# Patient Record
Sex: MALE | Race: WHITE | NOT HISPANIC OR LATINO | Employment: FULL TIME | URBAN - NONMETROPOLITAN AREA
[De-identification: names, ages, dates, MRNs, and addresses within clinical notes are randomized per-mention and may not be internally consistent; named-entity substitution may affect disease eponyms.]

---

## 2017-03-16 RX ORDER — BUSPIRONE HYDROCHLORIDE 10 MG/1
TABLET ORAL
Qty: 60 TABLET | Refills: 1 | Status: SHIPPED | OUTPATIENT
Start: 2017-03-16 | End: 2017-10-06 | Stop reason: SDUPTHER

## 2017-08-07 RX ORDER — LEVETIRACETAM 750 MG/1
TABLET ORAL
Qty: 60 TABLET | Refills: 0 | Status: SHIPPED | OUTPATIENT
Start: 2017-08-07 | End: 2017-09-10 | Stop reason: SDUPTHER

## 2017-09-11 RX ORDER — LEVETIRACETAM 750 MG/1
TABLET ORAL
Qty: 60 TABLET | Refills: 1 | Status: SHIPPED | OUTPATIENT
Start: 2017-09-11 | End: 2017-12-22 | Stop reason: SDUPTHER

## 2017-10-06 RX ORDER — BUSPIRONE HYDROCHLORIDE 10 MG/1
TABLET ORAL
Qty: 60 TABLET | Refills: 0 | Status: SHIPPED | OUTPATIENT
Start: 2017-10-06 | End: 2017-12-08 | Stop reason: SINTOL

## 2017-10-10 RX ORDER — LAMOTRIGINE 200 MG/1
TABLET ORAL
Qty: 60 TABLET | Refills: 0 | Status: SHIPPED | OUTPATIENT
Start: 2017-10-10 | End: 2017-12-20 | Stop reason: SDUPTHER

## 2017-12-08 ENCOUNTER — OFFICE VISIT (OUTPATIENT)
Dept: FAMILY MEDICINE CLINIC | Facility: CLINIC | Age: 32
End: 2017-12-08

## 2017-12-08 VITALS
HEIGHT: 69 IN | DIASTOLIC BLOOD PRESSURE: 72 MMHG | WEIGHT: 177 LBS | BODY MASS INDEX: 26.22 KG/M2 | SYSTOLIC BLOOD PRESSURE: 120 MMHG

## 2017-12-08 DIAGNOSIS — R41.840 CONCENTRATION DEFICIT: Primary | ICD-10-CM

## 2017-12-08 PROCEDURE — 99213 OFFICE O/P EST LOW 20 MIN: CPT | Performed by: NURSE PRACTITIONER

## 2017-12-08 RX ORDER — ATOMOXETINE 100 MG/1
100 CAPSULE ORAL DAILY
Qty: 30 CAPSULE | Refills: 11 | Status: SHIPPED | OUTPATIENT
Start: 2017-12-08 | End: 2018-05-11 | Stop reason: RX

## 2017-12-08 NOTE — PROGRESS NOTES
Chief Complaint   Patient presents with   • Referral for ADHD     testing      Subjective   Hammad Carpenter is a 32 y.o. male.     HPI Comments: Presents with concern with concentration issues getting worse-started the eval for add 2 years ago but never did finish the procress-symptoms are worsening     Fatigue   This is a recurrent problem. The current episode started 1 to 4 weeks ago. The problem occurs intermittently. The problem has been rapidly worsening. Associated symptoms include fatigue. Pertinent negatives include no abdominal pain, anorexia, arthralgias, change in bowel habit, chest pain, chills, congestion, coughing, diaphoresis, fever, headaches, joint swelling, myalgias, nausea, neck pain, numbness, rash, sore throat, swollen glands, urinary symptoms, vertigo, visual change, vomiting or weakness. Associated symptoms comments: Decrease labido associated -had labs in July . Nothing aggravates the symptoms. Treatments tried: working out  The treatment provided no relief.        The following portions of the patient's history were reviewed and updated as appropriate: allergies, current medications, past social history and problem list.    Review of Systems   Constitutional: Positive for fatigue. Negative for activity change, appetite change, chills, diaphoresis and fever.        Chronic anemia noted for past 5 years-will obtain records from another source to compare    HENT: Negative.  Negative for congestion and sore throat.    Eyes: Negative.  Negative for photophobia, pain, discharge, redness, itching and visual disturbance.   Respiratory: Negative.  Negative for apnea, cough, choking and chest tightness.    Cardiovascular: Negative.  Negative for chest pain, palpitations and leg swelling.   Gastrointestinal: Negative.  Negative for abdominal pain, anorexia, change in bowel habit, nausea and vomiting.   Endocrine: Negative.    Genitourinary: Negative.  Negative for difficulty urinating and  "dysuria.        Fatigue and decreased labido    Musculoskeletal: Negative.  Negative for arthralgias, back pain, gait problem, joint swelling, myalgias and neck pain.   Skin: Negative.  Negative for rash.   Allergic/Immunologic: Negative.    Neurological: Negative.  Negative for vertigo, tremors, weakness, numbness and headaches.   Hematological: Negative.  Negative for adenopathy. Does not bruise/bleed easily.   Psychiatric/Behavioral: Positive for decreased concentration and sleep disturbance. Negative for agitation, behavioral problems, confusion, dysphoric mood, hallucinations, self-injury and suicidal ideas. The patient is nervous/anxious. The patient is not hyperactive.        Objective   /72  Ht 175.3 cm (69\")  Wt 80.3 kg (177 lb)  BMI 26.14 kg/m2  Physical Exam   Constitutional: He is oriented to person, place, and time. He appears well-developed and well-nourished. No distress.   HENT:   Head: Normocephalic and atraumatic.   Mouth/Throat: No oropharyngeal exudate.   Eyes: EOM are normal. Pupils are equal, round, and reactive to light. Right eye exhibits no discharge. Left eye exhibits no discharge. No scleral icterus.   Neck: Normal range of motion. Neck supple. No JVD present. No tracheal deviation present. No thyromegaly present.   Cardiovascular: Normal rate.  Exam reveals no gallop and no friction rub.    No murmur heard.  Pulmonary/Chest: Effort normal and breath sounds normal. No stridor. No respiratory distress. He has no wheezes. He has no rales. He exhibits no tenderness.   Abdominal: Soft. Bowel sounds are normal. He exhibits no distension and no mass. There is no tenderness. There is no rebound and no guarding. No hernia.   Musculoskeletal: Normal range of motion. He exhibits no edema, tenderness or deformity.   Lymphadenopathy:     He has no cervical adenopathy.   Neurological: He is alert and oriented to person, place, and time. He has normal reflexes. He displays normal reflexes. No " cranial nerve deficit. He exhibits normal muscle tone. Coordination normal.   Skin: Skin is warm and dry. No rash noted. He is not diaphoretic. No erythema. No pallor.   Nursing note and vitals reviewed.      Assessment/Plan   Problem List Items Addressed This Visit        Other    Concentration deficit - Primary           New Medications Ordered This Visit   Medications   • atomoxetine (STRATTERA) 100 MG capsule     Sig: Take 1 capsule by mouth Daily.     Dispense:  30 capsule     Refill:  11     Plan -try non stimulant first and if worsen will do the testing for possible stimulant  -patient agrees recheck in 5 weeks

## 2017-12-20 RX ORDER — LAMOTRIGINE 200 MG/1
TABLET ORAL
Qty: 60 TABLET | Refills: 5 | Status: SHIPPED | OUTPATIENT
Start: 2017-12-20 | End: 2018-10-25 | Stop reason: SDUPTHER

## 2017-12-20 RX ORDER — BUSPIRONE HYDROCHLORIDE 10 MG/1
TABLET ORAL
Qty: 60 TABLET | Refills: 5 | Status: SHIPPED | OUTPATIENT
Start: 2017-12-20 | End: 2018-05-11

## 2017-12-22 RX ORDER — LEVETIRACETAM 750 MG/1
TABLET ORAL
Qty: 60 TABLET | Refills: 1 | Status: SHIPPED | OUTPATIENT
Start: 2017-12-22 | End: 2018-05-10 | Stop reason: SDUPTHER

## 2018-01-31 ENCOUNTER — TELEPHONE (OUTPATIENT)
Dept: FAMILY MEDICINE CLINIC | Facility: CLINIC | Age: 33
End: 2018-01-31

## 2018-02-02 DIAGNOSIS — F90.9 HYPERACTIVE ADULT SYNDROME: ICD-10-CM

## 2018-02-02 DIAGNOSIS — R88.8: Primary | ICD-10-CM

## 2018-02-02 RX ORDER — GUANFACINE 1 MG/1
1 TABLET, EXTENDED RELEASE ORAL DAILY
Qty: 30 TABLET | Refills: 5 | Status: SHIPPED | OUTPATIENT
Start: 2018-02-02 | End: 2018-05-11

## 2018-04-18 ENCOUNTER — OFFICE VISIT (OUTPATIENT)
Dept: BEHAVIORAL HEALTH | Facility: CLINIC | Age: 33
End: 2018-04-18

## 2018-04-18 DIAGNOSIS — F90.0 ADHD, PREDOMINANTLY INATTENTIVE TYPE: Primary | ICD-10-CM

## 2018-04-18 PROCEDURE — 90791 PSYCH DIAGNOSTIC EVALUATION: CPT | Performed by: PSYCHOLOGIST

## 2018-04-18 NOTE — PROGRESS NOTES
4/18/2018    Hammad Carpenter, a 32 y.o. male, was seen today for initial appointment lasting 45 minutes.  Patient is referred by ESTEBAN Parker .     SUBJECTIVE:  Patient requested an evaluation of attention deficit hyperactivity disorder.  He's had problems with impulsivity and focus all of his life.  He had difficulty in school but his mother did not agree to evaluation.  Currently he is forgetful, he has trouble scheduling things, he fails to follow through on tasks.  His caused him problems at work he forgets stuff, and makes mistakes that affect his job performance.  He started an ADHD evaluation with a psychologist in 2013 but failed to complete the evaluation.    FAMILY HISTORY:  Positive for ADHD.  This man is been  15 years he has 3 children ages 14, 10, and 8.  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    MENTAL STATUS:  Zaida presents as a young man who looks his stated age.  He is oriented ×3, thoughts are goal-directed and logical, he answers questions fully and completely.  There is no evidence of substance abuse disorder or a personality disorder.  He has problems at work because it takes him too long to do task and he loses focus.  This patient reports that he is scatterbrained, starts projects without finishing them, his mind tends to wander when he is trying to focus.  He also reported that people complain that he doesn't pay attention to them, blurts out what's on his mind, interrupts people and conversations, is forgetful, and a procrastinator.  His sleep varies and he tends to be tired during the day.  He denies depression, has some mild anxiety, mild uncomfortableness in public situations.  He denies panic attacks.  He is not bonds or irritable.  He seems to handle change and transitions without difficulty but he really likes her routine.  Routines help keep him focused.    DIAGNOSIS:    ICD-10-CM ICD-9-CM   1. ADHD, predominantly inattentive type F90.0 314.00        ASSESSMENT PLAN:  Plan is to evaluate for ADHD and mood disorder.  He was given [facility] adult rating scale for he and his wife to complete, I'll testing with the [place] computerized continuous performance test          This document has been electronically signed by Stephon Kraft EdD on April 18, 2018 10:04 AM

## 2018-05-04 ENCOUNTER — OFFICE VISIT (OUTPATIENT)
Dept: BEHAVIORAL HEALTH | Facility: CLINIC | Age: 33
End: 2018-05-04

## 2018-05-04 DIAGNOSIS — F90.0 ADHD, PREDOMINANTLY INATTENTIVE TYPE: Primary | ICD-10-CM

## 2018-05-04 PROCEDURE — 90834 PSYTX W PT 45 MINUTES: CPT | Performed by: PSYCHOLOGIST

## 2018-05-04 NOTE — PROGRESS NOTES
5/4/2018    Hammad Carpenter, a 32 y.o. male, was seen today for a psychological evaluation lasting 45 minutes.  Patient is referred by ESTEBAN Parker .     SUBJECTIVE:  Patient requested an evaluation of attention deficit hyperactivity disorder.  He's had problems with impulsivity and focus all of his life.  He had difficulty in school but his mother did not agree to evaluation.  Currently he is forgetful, he has trouble scheduling things, he fails to follow through on tasks.  His caused him problems at work he forgets stuff, and makes mistakes that affect his job performance.  He started an ADHD evaluation with a psychologist in 2013 but failed to complete the evaluation.    FAMILY HISTORY:  Positive for ADHD.  This man is been  15 years he has 3 children ages 14, 10, and 8.  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    MENTAL STATUS:  Zaida presents as a young man who looks his stated age.  He is oriented ×3, thoughts are goal-directed and logical, he answers questions fully and completely.  There is no evidence of substance abuse disorder or a personality disorder.  He has problems at work because it takes him too long to do task and he loses focus.  This patient reports that he is scatterbrained, starts projects without finishing them, his mind tends to wander when he is trying to focus.  He also reported that people complain that he doesn't pay attention to them, blurts out what's on his mind, interrupts people and conversations, is forgetful, and a procrastinator.  His sleep varies and he tends to be tired during the day.  He denies depression, has some mild anxiety, mild uncomfortableness in public situations.  He denies panic attacks.  He is not bonds or irritable.  He seems to handle change and transitions without difficulty but he really likes her routine.  Routines help keep him focused.    Evaluation consisted of psychological testing with the [place] computerized continuous performance  test, and the [facility] adult Gen. symptoms checklist completed by the patient.  The [place] requires the patient to click a mouse button for certain visual and auditory targets displayed on the computer.  In addition, the patient has to refrain from clicking on the mouse button for visual and auditory distractor non-targets.  Scores on the [place] have a mean of 100 and a standard deviation of 15 points, any score of 80 or lower identifies a significant problem area.  The patient's scores were impulsivity 70, attention 77, hyperactivity 108.  The test results show that the patient made errors by not responding when a response was called for, indicating inattention.  The patient made other errors by responding when a response was not called for, indicating impulsivity.  The patient lost focus several times when his/her attention tended to wander.  There was a lot of inconsistency in the patient's response times, also showing fluctuating attention.      On the adult checklist this patient reported: Trouble sustaining attention, difficulty completing tasks, feeling overwhelmed, trouble maintaining and organized work.,  Inconsistent work performance, impulsive decision-making, difficulty delaying what he wanted, impatience and easy frustration.        DIAGNOSIS:    ICD-10-CM ICD-9-CM   1. ADHD, predominantly inattentive type F90.0 314.00       ASSESSMENT PLAN:  Recommendation is for him to see his provider for consideration of treatment with appropriate stimulant medication          This document has been electronically signed by Stephon Kraft EdD on May 4, 2018 9:54 AM

## 2018-05-10 RX ORDER — LEVETIRACETAM 750 MG/1
TABLET ORAL
Qty: 60 TABLET | Refills: 0 | Status: SHIPPED | OUTPATIENT
Start: 2018-05-10 | End: 2018-07-18 | Stop reason: SDUPTHER

## 2018-05-11 ENCOUNTER — OFFICE VISIT (OUTPATIENT)
Dept: FAMILY MEDICINE CLINIC | Facility: CLINIC | Age: 33
End: 2018-05-11

## 2018-05-11 ENCOUNTER — TELEPHONE (OUTPATIENT)
Dept: FAMILY MEDICINE CLINIC | Facility: CLINIC | Age: 33
End: 2018-05-11

## 2018-05-11 VITALS
HEART RATE: 56 BPM | WEIGHT: 167 LBS | OXYGEN SATURATION: 100 % | DIASTOLIC BLOOD PRESSURE: 78 MMHG | HEIGHT: 69 IN | BODY MASS INDEX: 24.73 KG/M2 | SYSTOLIC BLOOD PRESSURE: 118 MMHG

## 2018-05-11 VITALS
SYSTOLIC BLOOD PRESSURE: 112 MMHG | WEIGHT: 167 LBS | BODY MASS INDEX: 24.73 KG/M2 | DIASTOLIC BLOOD PRESSURE: 80 MMHG | HEIGHT: 69 IN

## 2018-05-11 DIAGNOSIS — G40.909 SEIZURE DISORDER (HCC): ICD-10-CM

## 2018-05-11 DIAGNOSIS — Z79.899 HIGH RISK MEDICATION USE: Primary | ICD-10-CM

## 2018-05-11 DIAGNOSIS — F90.0 ADHD, PREDOMINANTLY INATTENTIVE TYPE: ICD-10-CM

## 2018-05-11 DIAGNOSIS — R41.840 CONCENTRATION DEFICIT: Primary | ICD-10-CM

## 2018-05-11 PROCEDURE — 99213 OFFICE O/P EST LOW 20 MIN: CPT | Performed by: FAMILY MEDICINE

## 2018-05-11 PROCEDURE — 93005 ELECTROCARDIOGRAM TRACING: CPT | Performed by: FAMILY MEDICINE

## 2018-05-11 PROCEDURE — 93010 ELECTROCARDIOGRAM REPORT: CPT | Performed by: INTERNAL MEDICINE

## 2018-05-11 NOTE — PROGRESS NOTES
"Allison Carpenter is a 32 y.o. male.     History of Present Illness   compliance with medication regimen  often fidgets with hands or feet or squirms in seat - Yes , is often \"on the go\" or often acts as if \"driven by a motor\" - Yes  and often talks excessively - Yes     often has difficulty awaiting turn - Yes  and often interrupts or intrudes on others - Yes           The following portions of the patient's history were reviewed and updated as appropriate: allergies, current medications, past family history, past medical history, past social history, past surgical history and problem list.    Review of Systems   Cardiovascular: Negative for palpitations.   Psychiatric/Behavioral: Positive for sleep disturbance.       Objective   Physical Exam   Constitutional: He is oriented to person, place, and time. He appears well-developed and well-nourished. No distress.   HENT:   Head: Normocephalic and atraumatic.   Cardiovascular: Normal rate, regular rhythm and normal heart sounds.    Pulmonary/Chest: Effort normal and breath sounds normal.   Neurological: He is alert and oriented to person, place, and time. No cranial nerve deficit or sensory deficit.   Reflex Scores:       Patellar reflexes are 2+ on the right side and 2+ on the left side.  Skin: Skin is warm and dry. He is not diaphoretic.   Psychiatric: He has a normal mood and affect. His behavior is normal. Judgment and thought content normal.   Nursing note and vitals reviewed.      Assessment/Plan   Problems Addressed this Visit        Other    ADHD, predominantly inattentive type      Other Visit Diagnoses     High risk medication use    -  Primary    Relevant Orders    ECG 12 Lead (Completed)    Seizure disorder                Will request 2nd opinion with neurology before starting medication due to potential drug-drug interaction and risk of inducing Seizures. Will arrange once he checks with his insurance company. Last seizure 2006.     "

## 2018-05-11 NOTE — PROGRESS NOTES
Chief Complaint   Patient presents with   • Follow-up     after seeing Dr Swapnil Cueva Emmanuel Carpenter is a 32 y.o. male.     Presents with concern with concentration issues getting worse-started the eval for add 2 years ago but never did finish the procress-symptoms are worsening -recent Diagnosis with ADD with Stephon Kraft       Fatigue   This is a recurrent problem. The current episode started 1 to 4 weeks ago. The problem occurs intermittently. The problem has been rapidly worsening. Associated symptoms include fatigue. Pertinent negatives include no abdominal pain, arthralgias, chest pain, chills, congestion, coughing, diaphoresis, fever, joint swelling, myalgias, nausea, neck pain or vomiting. Associated symptoms comments: Decrease labido associated -had labs in July . Nothing aggravates the symptoms. Treatments tried: working out -staying stress  The treatment provided no relief.        The following portions of the patient's history were reviewed and updated as appropriate: allergies, current medications, past social history and problem list.    Review of Systems   Constitutional: Positive for fatigue. Negative for activity change, appetite change, chills, diaphoresis, fever and unexpected weight change.   HENT: Negative.  Negative for congestion, dental problem, drooling, ear discharge and ear pain.    Eyes: Negative.  Negative for photophobia, pain, discharge, redness, itching and visual disturbance.   Respiratory: Negative.  Negative for apnea, cough, choking, chest tightness, shortness of breath, wheezing and stridor.    Cardiovascular: Negative.  Negative for chest pain, palpitations and leg swelling.   Gastrointestinal: Negative.  Negative for abdominal distention, abdominal pain, anal bleeding, blood in stool, constipation, diarrhea, nausea, rectal pain and vomiting.   Endocrine: Negative.  Negative for cold intolerance, heat intolerance and polydipsia.   Genitourinary: Negative.   "  Musculoskeletal: Negative.  Negative for arthralgias, back pain, gait problem, joint swelling, myalgias, neck pain and neck stiffness.   Skin: Negative.    Allergic/Immunologic: Negative.    Neurological: Negative.    Hematological: Negative.    Psychiatric/Behavioral: Positive for decreased concentration.        Concentration issues-see Stephon Cortes note for further information    All other systems reviewed and are negative.      Objective   /80   Ht 175.3 cm (69\")   Wt 75.8 kg (167 lb)   BMI 24.66 kg/m²   Physical Exam   Constitutional: He is oriented to person, place, and time. He appears well-developed and well-nourished. No distress.   HENT:   Head: Normocephalic and atraumatic.   Right Ear: External ear normal.   Left Ear: External ear normal.   Mouth/Throat: No oropharyngeal exudate.   Eyes: EOM are normal. Pupils are equal, round, and reactive to light. Right eye exhibits no discharge. Left eye exhibits no discharge. No scleral icterus.   Neck: Normal range of motion. Neck supple. No JVD present. No tracheal deviation present. No thyromegaly present.   Cardiovascular: Normal rate and regular rhythm.  Exam reveals no gallop and no friction rub.    No murmur heard.  Pulmonary/Chest: Effort normal and breath sounds normal. No stridor. No respiratory distress. He has no wheezes. He has no rales. He exhibits no tenderness.   Abdominal: Soft. Bowel sounds are normal. He exhibits no distension and no mass. There is no tenderness. There is no rebound and no guarding. No hernia.   Musculoskeletal: Normal range of motion. He exhibits no edema, tenderness or deformity.   Lymphadenopathy:     He has no cervical adenopathy.   Neurological: He is alert and oriented to person, place, and time. He has normal reflexes. He displays normal reflexes. No cranial nerve deficit. He exhibits normal muscle tone. Coordination normal.   Skin: Skin is warm and dry. No rash noted. He is not diaphoretic. No erythema. No " pallor.   Nursing note and vitals reviewed.      Assessment/Plan   Problem List Items Addressed This Visit        Other    Concentration deficit - Primary      Other Visit Diagnoses    None.        No orders of the defined types were placed in this encounter.     will be following with Dr Pizarro for ADD treatment today after my visit

## 2018-05-21 DIAGNOSIS — G40.909 SEIZURE DISORDER (HCC): Primary | ICD-10-CM

## 2018-06-29 ENCOUNTER — OFFICE VISIT (OUTPATIENT)
Dept: FAMILY MEDICINE CLINIC | Facility: CLINIC | Age: 33
End: 2018-06-29

## 2018-06-29 ENCOUNTER — TELEPHONE (OUTPATIENT)
Dept: FAMILY MEDICINE CLINIC | Facility: CLINIC | Age: 33
End: 2018-06-29

## 2018-06-29 VITALS
DIASTOLIC BLOOD PRESSURE: 74 MMHG | HEART RATE: 68 BPM | OXYGEN SATURATION: 99 % | SYSTOLIC BLOOD PRESSURE: 120 MMHG | BODY MASS INDEX: 25.56 KG/M2 | HEIGHT: 69 IN | WEIGHT: 172.6 LBS

## 2018-06-29 DIAGNOSIS — F90.0 ADHD, PREDOMINANTLY INATTENTIVE TYPE: Primary | ICD-10-CM

## 2018-06-29 PROCEDURE — 99213 OFFICE O/P EST LOW 20 MIN: CPT | Performed by: FAMILY MEDICINE

## 2018-06-29 RX ORDER — DEXTROAMPHETAMINE SACCHARATE, AMPHETAMINE ASPARTATE MONOHYDRATE, DEXTROAMPHETAMINE SULFATE AND AMPHETAMINE SULFATE 5; 5; 5; 5 MG/1; MG/1; MG/1; MG/1
20 CAPSULE, EXTENDED RELEASE ORAL EVERY MORNING
Qty: 30 CAPSULE | Refills: 0 | Status: SHIPPED | OUTPATIENT
Start: 2018-07-30 | End: 2018-07-02

## 2018-06-29 RX ORDER — DEXTROAMPHETAMINE SACCHARATE, AMPHETAMINE ASPARTATE MONOHYDRATE, DEXTROAMPHETAMINE SULFATE AND AMPHETAMINE SULFATE 5; 5; 5; 5 MG/1; MG/1; MG/1; MG/1
20 CAPSULE, EXTENDED RELEASE ORAL EVERY MORNING
Qty: 30 CAPSULE | Refills: 0 | Status: SHIPPED | OUTPATIENT
Start: 2018-06-29 | End: 2018-07-02

## 2018-06-29 RX ORDER — DEXTROAMPHETAMINE SACCHARATE, AMPHETAMINE ASPARTATE MONOHYDRATE, DEXTROAMPHETAMINE SULFATE AND AMPHETAMINE SULFATE 5; 5; 5; 5 MG/1; MG/1; MG/1; MG/1
20 CAPSULE, EXTENDED RELEASE ORAL EVERY MORNING
Qty: 30 CAPSULE | Refills: 0 | Status: SHIPPED | OUTPATIENT
Start: 2018-08-29 | End: 2018-07-02

## 2018-06-29 NOTE — PROGRESS NOTES
"Allison Carpenter is a 32 y.o. male.     History of Present Illness     compliance with medication regimen  often fidgets with hands or feet or squirms in seat - Yes , is often \"on the go\" or often acts as if \"driven by a motor\" - Yes  and often talks excessively - Yes     often has difficulty awaiting turn - Yes  and often interrupts or intrudes on others - Yes           The following portions of the patient's history were reviewed and updated as appropriate: allergies, current medications, past family history, past medical history, past social history, past surgical history and problem list.    Review of Systems   Constitutional: Positive for fatigue.   Cardiovascular: Negative for palpitations.   Psychiatric/Behavioral: Positive for sleep disturbance.       Objective   Physical Exam   Constitutional: He is oriented to person, place, and time. He appears well-developed and well-nourished. No distress.   HENT:   Head: Normocephalic and atraumatic.   Cardiovascular: Normal rate, regular rhythm and normal heart sounds.    Pulmonary/Chest: Effort normal and breath sounds normal.   Neurological: He is alert and oriented to person, place, and time. No cranial nerve deficit or sensory deficit.   Reflex Scores:       Patellar reflexes are 2+ on the right side and 2+ on the left side.  Skin: Skin is warm and dry. He is not diaphoretic.   Psychiatric: He has a normal mood and affect. His behavior is normal. Judgment and thought content normal.   Nursing note and vitals reviewed.      Assessment/Plan   Problems Addressed this Visit        Other    ADHD, predominantly inattentive type - Primary    Relevant Medications    amphetamine-dextroamphetamine XR (ADDERALL XR) 20 MG 24 hr capsule    amphetamine-dextroamphetamine XR (ADDERALL XR) 20 MG 24 hr capsule (Start on 7/30/2018)    amphetamine-dextroamphetamine XR (ADDERALL XR) 20 MG 24 hr capsule (Start on 8/29/2018)            Neurologist note reviewed and " risk of seizure on medication discusseed.

## 2018-07-02 ENCOUNTER — TELEPHONE (OUTPATIENT)
Dept: FAMILY MEDICINE CLINIC | Facility: CLINIC | Age: 33
End: 2018-07-02

## 2018-07-02 RX ORDER — DEXTROAMPHETAMINE SACCHARATE, AMPHETAMINE ASPARTATE, DEXTROAMPHETAMINE SULFATE AND AMPHETAMINE SULFATE 2.5; 2.5; 2.5; 2.5 MG/1; MG/1; MG/1; MG/1
10 TABLET ORAL 2 TIMES DAILY
Qty: 60 TABLET | Refills: 0 | Status: SHIPPED | OUTPATIENT
Start: 2018-07-02 | End: 2018-07-02 | Stop reason: SDUPTHER

## 2018-07-02 RX ORDER — DEXTROAMPHETAMINE SACCHARATE, AMPHETAMINE ASPARTATE, DEXTROAMPHETAMINE SULFATE AND AMPHETAMINE SULFATE 2.5; 2.5; 2.5; 2.5 MG/1; MG/1; MG/1; MG/1
10 TABLET ORAL 2 TIMES DAILY
Qty: 60 TABLET | Refills: 0 | Status: SHIPPED | OUTPATIENT
Start: 2018-07-02 | End: 2018-10-01 | Stop reason: SDUPTHER

## 2018-07-18 RX ORDER — LEVETIRACETAM 750 MG/1
TABLET ORAL
Qty: 60 TABLET | Refills: 5 | Status: SHIPPED | OUTPATIENT
Start: 2018-07-18 | End: 2019-01-30 | Stop reason: SDUPTHER

## 2018-10-01 ENCOUNTER — OFFICE VISIT (OUTPATIENT)
Dept: FAMILY MEDICINE CLINIC | Facility: CLINIC | Age: 33
End: 2018-10-01

## 2018-10-01 VITALS
WEIGHT: 165.4 LBS | BODY MASS INDEX: 24.5 KG/M2 | OXYGEN SATURATION: 99 % | SYSTOLIC BLOOD PRESSURE: 118 MMHG | DIASTOLIC BLOOD PRESSURE: 78 MMHG | HEIGHT: 69 IN | HEART RATE: 71 BPM

## 2018-10-01 DIAGNOSIS — F90.0 ADHD, PREDOMINANTLY INATTENTIVE TYPE: Primary | ICD-10-CM

## 2018-10-01 PROCEDURE — 99213 OFFICE O/P EST LOW 20 MIN: CPT | Performed by: FAMILY MEDICINE

## 2018-10-01 RX ORDER — DEXTROAMPHETAMINE SACCHARATE, AMPHETAMINE ASPARTATE, DEXTROAMPHETAMINE SULFATE AND AMPHETAMINE SULFATE 2.5; 2.5; 2.5; 2.5 MG/1; MG/1; MG/1; MG/1
10 TABLET ORAL 2 TIMES DAILY
Qty: 60 TABLET | Refills: 0 | Status: SHIPPED | OUTPATIENT
Start: 2018-10-01 | End: 2018-10-01 | Stop reason: SDUPTHER

## 2018-10-01 RX ORDER — DEXTROAMPHETAMINE SACCHARATE, AMPHETAMINE ASPARTATE, DEXTROAMPHETAMINE SULFATE AND AMPHETAMINE SULFATE 2.5; 2.5; 2.5; 2.5 MG/1; MG/1; MG/1; MG/1
10 TABLET ORAL 2 TIMES DAILY
Qty: 60 TABLET | Refills: 0 | Status: SHIPPED | OUTPATIENT
Start: 2018-10-01 | End: 2018-12-28 | Stop reason: SDUPTHER

## 2018-10-01 NOTE — PROGRESS NOTES
"Allison Carpenter is a 33 y.o. male.     History of Present Illness     compliance with medication regimen  often fidgets with hands or feet or squirms in seat - Yes , is often \"on the go\" or often acts as if \"driven by a motor\" - Yes  and often talks excessively - Yes     often has difficulty awaiting turn - Yes  and often interrupts or intrudes on others - Yes           The following portions of the patient's history were reviewed and updated as appropriate: allergies, current medications, past family history, past medical history, past social history, past surgical history and problem list.    Review of Systems   Cardiovascular: Negative for palpitations.   Psychiatric/Behavioral: Negative for sleep disturbance.       Objective   Physical Exam   Constitutional: He is oriented to person, place, and time. He appears well-developed and well-nourished. No distress.   HENT:   Head: Normocephalic and atraumatic.   Cardiovascular: Normal rate, regular rhythm and normal heart sounds.    Pulmonary/Chest: Effort normal and breath sounds normal.   Neurological: He is alert and oriented to person, place, and time. No cranial nerve deficit or sensory deficit.   Reflex Scores:       Patellar reflexes are 2+ on the right side and 2+ on the left side.  Skin: Skin is warm and dry. He is not diaphoretic.   Psychiatric: He has a normal mood and affect. His behavior is normal. Judgment and thought content normal.   Nursing note and vitals reviewed.      Assessment/Plan   Problems Addressed this Visit        Other    ADHD, predominantly inattentive type - Primary    Relevant Medications    amphetamine-dextroamphetamine (ADDERALL) 10 MG tablet                 "

## 2018-10-25 RX ORDER — LAMOTRIGINE 200 MG/1
TABLET ORAL
Qty: 60 TABLET | Refills: 4 | Status: SHIPPED | OUTPATIENT
Start: 2018-10-25 | End: 2019-04-07 | Stop reason: SDUPTHER

## 2018-12-28 ENCOUNTER — APPOINTMENT (OUTPATIENT)
Dept: LAB | Facility: HOSPITAL | Age: 33
End: 2018-12-28

## 2018-12-28 ENCOUNTER — OFFICE VISIT (OUTPATIENT)
Dept: FAMILY MEDICINE CLINIC | Facility: CLINIC | Age: 33
End: 2018-12-28

## 2018-12-28 VITALS
BODY MASS INDEX: 25.87 KG/M2 | OXYGEN SATURATION: 99 % | SYSTOLIC BLOOD PRESSURE: 120 MMHG | WEIGHT: 174.7 LBS | HEART RATE: 79 BPM | HEIGHT: 69 IN | DIASTOLIC BLOOD PRESSURE: 78 MMHG

## 2018-12-28 DIAGNOSIS — F90.0 ADHD, PREDOMINANTLY INATTENTIVE TYPE: ICD-10-CM

## 2018-12-28 DIAGNOSIS — Z79.899 HIGH RISK MEDICATION USE: Primary | ICD-10-CM

## 2018-12-28 DIAGNOSIS — G40.909 SEIZURE DISORDER (HCC): ICD-10-CM

## 2018-12-28 LAB
ALBUMIN SERPL-MCNC: 4.6 G/DL (ref 3.4–4.8)
ALBUMIN/GLOB SERPL: 1.7 G/DL (ref 1.1–1.8)
ALP SERPL-CCNC: 109 U/L (ref 38–126)
ALT SERPL W P-5'-P-CCNC: 23 U/L (ref 21–72)
ANION GAP SERPL CALCULATED.3IONS-SCNC: 7 MMOL/L (ref 5–15)
AST SERPL-CCNC: 34 U/L (ref 17–59)
BASOPHILS # BLD AUTO: 0.03 10*3/MM3 (ref 0–0.2)
BASOPHILS NFR BLD AUTO: 0.4 % (ref 0–2)
BILIRUB SERPL-MCNC: 0.6 MG/DL (ref 0.2–1.3)
BUN BLD-MCNC: 16 MG/DL (ref 7–21)
BUN/CREAT SERPL: 14.7 (ref 7–25)
CALCIUM SPEC-SCNC: 9.3 MG/DL (ref 8.4–10.2)
CHLORIDE SERPL-SCNC: 99 MMOL/L (ref 95–110)
CO2 SERPL-SCNC: 31 MMOL/L (ref 22–31)
CREAT BLD-MCNC: 1.09 MG/DL (ref 0.7–1.3)
DEPRECATED RDW RBC AUTO: 37 FL (ref 35.1–43.9)
EOSINOPHIL # BLD AUTO: 0.07 10*3/MM3 (ref 0–0.7)
EOSINOPHIL NFR BLD AUTO: 0.9 % (ref 0–7)
ERYTHROCYTE [DISTWIDTH] IN BLOOD BY AUTOMATED COUNT: 11.9 % (ref 11.5–14.5)
GFR SERPL CREATININE-BSD FRML MDRD: 77 ML/MIN/1.73 (ref 70–162)
GLOBULIN UR ELPH-MCNC: 2.7 GM/DL (ref 2.3–3.5)
GLUCOSE BLD-MCNC: 102 MG/DL (ref 60–100)
HCT VFR BLD AUTO: 38.5 % (ref 39–49)
HGB BLD-MCNC: 13.7 G/DL (ref 13.7–17.3)
IMM GRANULOCYTES # BLD AUTO: 0.01 10*3/MM3 (ref 0–0.02)
IMM GRANULOCYTES NFR BLD AUTO: 0.1 % (ref 0–0.5)
LYMPHOCYTES # BLD AUTO: 2.02 10*3/MM3 (ref 0.6–4.2)
LYMPHOCYTES NFR BLD AUTO: 26.9 % (ref 10–50)
MCH RBC QN AUTO: 30.5 PG (ref 26.5–34)
MCHC RBC AUTO-ENTMCNC: 35.6 G/DL (ref 31.5–36.3)
MCV RBC AUTO: 85.7 FL (ref 80–98)
MONOCYTES # BLD AUTO: 0.45 10*3/MM3 (ref 0–0.9)
MONOCYTES NFR BLD AUTO: 6 % (ref 0–12)
NEUTROPHILS # BLD AUTO: 4.93 10*3/MM3 (ref 2–8.6)
NEUTROPHILS NFR BLD AUTO: 65.7 % (ref 37–80)
PLATELET # BLD AUTO: 253 10*3/MM3 (ref 150–450)
PMV BLD AUTO: 9.2 FL (ref 8–12)
POTASSIUM BLD-SCNC: 4.1 MMOL/L (ref 3.5–5.1)
PROT SERPL-MCNC: 7.3 G/DL (ref 6.3–8.6)
RBC # BLD AUTO: 4.49 10*6/MM3 (ref 4.37–5.74)
SODIUM BLD-SCNC: 137 MMOL/L (ref 137–145)
WBC NRBC COR # BLD: 7.51 10*3/MM3 (ref 3.2–9.8)

## 2018-12-28 PROCEDURE — 99213 OFFICE O/P EST LOW 20 MIN: CPT | Performed by: FAMILY MEDICINE

## 2018-12-28 PROCEDURE — 36415 COLL VENOUS BLD VENIPUNCTURE: CPT | Performed by: FAMILY MEDICINE

## 2018-12-28 PROCEDURE — 85025 COMPLETE CBC W/AUTO DIFF WBC: CPT | Performed by: FAMILY MEDICINE

## 2018-12-28 PROCEDURE — 80053 COMPREHEN METABOLIC PANEL: CPT | Performed by: FAMILY MEDICINE

## 2018-12-28 RX ORDER — DEXTROAMPHETAMINE SACCHARATE, AMPHETAMINE ASPARTATE, DEXTROAMPHETAMINE SULFATE AND AMPHETAMINE SULFATE 2.5; 2.5; 2.5; 2.5 MG/1; MG/1; MG/1; MG/1
10 TABLET ORAL 3 TIMES DAILY
Qty: 90 TABLET | Refills: 0 | Status: SHIPPED | OUTPATIENT
Start: 2018-12-28 | End: 2018-12-28 | Stop reason: SDUPTHER

## 2018-12-28 RX ORDER — DEXTROAMPHETAMINE SACCHARATE, AMPHETAMINE ASPARTATE, DEXTROAMPHETAMINE SULFATE AND AMPHETAMINE SULFATE 2.5; 2.5; 2.5; 2.5 MG/1; MG/1; MG/1; MG/1
10 TABLET ORAL 3 TIMES DAILY
Qty: 90 TABLET | Refills: 0 | Status: SHIPPED | OUTPATIENT
Start: 2018-12-28 | End: 2019-02-26 | Stop reason: SDUPTHER

## 2018-12-28 NOTE — PROGRESS NOTES
"Allison Carpenter is a 33 y.o. male.     Seizures    This is a chronic (none rescently) problem. The problem has not changed since onset.     compliance with medication regimen  often fidgets with hands or feet or squirms in seat -no , is often \"on the go\" or often acts as if \"driven by a motor\" -no  and often talks excessively - no     often has difficulty awaiting turn - no  and often interrupts or intrudes on others - no      Focus issues at noon. Will change to tid    The following portions of the patient's history were reviewed and updated as appropriate: allergies, current medications, past family history, past medical history, past social history, past surgical history and problem list.    Review of Systems   Cardiovascular: Negative for palpitations.   Neurological: Positive for seizures.   Psychiatric/Behavioral: Negative for sleep disturbance.       Objective   Physical Exam   Constitutional: He is oriented to person, place, and time. He appears well-developed and well-nourished. No distress.   HENT:   Head: Normocephalic and atraumatic.   Cardiovascular: Normal rate, regular rhythm and normal heart sounds.   Pulmonary/Chest: Effort normal and breath sounds normal.   Neurological: He is alert and oriented to person, place, and time. No cranial nerve deficit or sensory deficit.   Reflex Scores:       Patellar reflexes are 2+ on the right side and 2+ on the left side.  Skin: Skin is warm and dry. He is not diaphoretic.   Psychiatric: He has a normal mood and affect. His behavior is normal. Judgment and thought content normal.   Nursing note and vitals reviewed.      Assessment/Plan   Problems Addressed this Visit        Other    ADHD, predominantly inattentive type    Relevant Medications    amphetamine-dextroamphetamine (ADDERALL) 10 MG tablet      Other Visit Diagnoses     High risk medication use    -  Primary    Relevant Orders    Comprehensive Metabolic Panel    CBC & Differential    " Seizure disorder (CMS/HCC)

## 2019-01-30 RX ORDER — LEVETIRACETAM 750 MG/1
TABLET ORAL
Qty: 60 TABLET | Refills: 5 | Status: SHIPPED | OUTPATIENT
Start: 2019-01-30 | End: 2019-08-22 | Stop reason: SDUPTHER

## 2019-02-26 ENCOUNTER — TELEPHONE (OUTPATIENT)
Dept: FAMILY MEDICINE CLINIC | Facility: CLINIC | Age: 34
End: 2019-02-26

## 2019-02-26 RX ORDER — DEXTROAMPHETAMINE SACCHARATE, AMPHETAMINE ASPARTATE, DEXTROAMPHETAMINE SULFATE AND AMPHETAMINE SULFATE 2.5; 2.5; 2.5; 2.5 MG/1; MG/1; MG/1; MG/1
10 TABLET ORAL 3 TIMES DAILY
Qty: 90 TABLET | Refills: 0 | Status: SHIPPED | OUTPATIENT
Start: 2019-02-26 | End: 2019-03-25 | Stop reason: SDUPTHER

## 2019-02-26 NOTE — TELEPHONE ENCOUNTER
Called patient and LM letting him know that Rx was ready and could be picked up at  at his convenience.

## 2019-02-26 NOTE — TELEPHONE ENCOUNTER
He called and said he needs to get pres for his adderall 10 mg sent to [facility] in [facility] -said he gets it for 3 months and is time to get it for March and the pharmacy said they can only do for 60 days and needs it for this month. He uses [facility] in [facility] and can be reached at [phone].

## 2019-03-25 ENCOUNTER — OFFICE VISIT (OUTPATIENT)
Dept: FAMILY MEDICINE CLINIC | Facility: CLINIC | Age: 34
End: 2019-03-25

## 2019-03-25 VITALS
HEART RATE: 76 BPM | DIASTOLIC BLOOD PRESSURE: 72 MMHG | BODY MASS INDEX: 25.18 KG/M2 | OXYGEN SATURATION: 98 % | HEIGHT: 69 IN | SYSTOLIC BLOOD PRESSURE: 120 MMHG | WEIGHT: 170 LBS

## 2019-03-25 DIAGNOSIS — G40.909 SEIZURE DISORDER (HCC): Primary | ICD-10-CM

## 2019-03-25 DIAGNOSIS — F90.0 ADHD, PREDOMINANTLY INATTENTIVE TYPE: ICD-10-CM

## 2019-03-25 PROCEDURE — 99213 OFFICE O/P EST LOW 20 MIN: CPT | Performed by: FAMILY MEDICINE

## 2019-03-25 RX ORDER — DEXTROAMPHETAMINE SACCHARATE, AMPHETAMINE ASPARTATE, DEXTROAMPHETAMINE SULFATE AND AMPHETAMINE SULFATE 2.5; 2.5; 2.5; 2.5 MG/1; MG/1; MG/1; MG/1
10 TABLET ORAL 3 TIMES DAILY
Qty: 90 TABLET | Refills: 0 | Status: SHIPPED | OUTPATIENT
Start: 2019-03-25 | End: 2019-05-28 | Stop reason: SDUPTHER

## 2019-03-25 RX ORDER — DEXTROAMPHETAMINE SACCHARATE, AMPHETAMINE ASPARTATE, DEXTROAMPHETAMINE SULFATE AND AMPHETAMINE SULFATE 2.5; 2.5; 2.5; 2.5 MG/1; MG/1; MG/1; MG/1
10 TABLET ORAL 3 TIMES DAILY
Qty: 90 TABLET | Refills: 0 | Status: SHIPPED | OUTPATIENT
Start: 2019-03-25 | End: 2019-03-25 | Stop reason: SDUPTHER

## 2019-03-25 NOTE — PROGRESS NOTES
"Allison Carpenter is a 33 y.o. male.     Seizures    This is a chronic (none rescently) problem. The problem has not changed since onset.     compliance with medication regimen  often fidgets with hands or feet or squirms in seat -no , is often \"on the go\" or often acts as if \"driven by a motor\" -no  and often talks excessively - no     often has difficulty awaiting turn - no  and often interrupts or intrudes on others - no      Focus issues at noon. Will change to tid    The following portions of the patient's history were reviewed and updated as appropriate: allergies, current medications, past family history, past medical history, past social history, past surgical history and problem list.    Review of Systems   Cardiovascular: Negative for palpitations.   Neurological: Positive for seizures.   Psychiatric/Behavioral: Negative for sleep disturbance.       Objective   Physical Exam   Constitutional: He is oriented to person, place, and time. He appears well-developed and well-nourished. No distress.   HENT:   Head: Normocephalic and atraumatic.   Cardiovascular: Normal rate, regular rhythm and normal heart sounds.   Pulmonary/Chest: Effort normal and breath sounds normal.   Neurological: He is alert and oriented to person, place, and time. No cranial nerve deficit or sensory deficit.   Reflex Scores:       Patellar reflexes are 2+ on the right side and 2+ on the left side.  Skin: Skin is warm and dry. He is not diaphoretic.   Psychiatric: He has a normal mood and affect. His behavior is normal. Judgment and thought content normal.   Nursing note and vitals reviewed.      Assessment/Plan   Problems Addressed this Visit        Other    ADHD, predominantly inattentive type    Relevant Medications    amphetamine-dextroamphetamine (ADDERALL) 10 MG tablet      Other Visit Diagnoses     Seizure disorder (CMS/[facility])    -  Primary                 "

## 2019-03-28 ENCOUNTER — TELEPHONE (OUTPATIENT)
Dept: FAMILY MEDICINE CLINIC | Facility: CLINIC | Age: 34
End: 2019-03-28

## 2019-03-28 NOTE — TELEPHONE ENCOUNTER
KAREN CROWLEY HAD GONE TO  PRESP FOR HIS ADDERALL AT [facility] AND THEY TOLD HIM DR MOYER WOULD HAVE TO CALL HIS [employer] INSURANCE AND VERIFY THE MEDICATION...PLEASE CK INTO THIS FOR THEM  CALLBACK# [phone]

## 2019-03-29 NOTE — TELEPHONE ENCOUNTER
I have not received anything from his pharmacy nor his insurance company pertaining to his Adderall.  If or when I do, I will take care of it for him.  If her calls, please let him know to check with his pharmacy and tell them to send for a PA if that is what is needed.  I can't start a PA process until I know it's been denied by his insurance through his pharmacy.

## 2019-04-08 RX ORDER — LAMOTRIGINE 200 MG/1
TABLET ORAL
Qty: 60 TABLET | Refills: 3 | Status: SHIPPED | OUTPATIENT
Start: 2019-04-08 | End: 2019-08-22 | Stop reason: SDUPTHER

## 2019-04-12 ENCOUNTER — OFFICE VISIT (OUTPATIENT)
Dept: FAMILY MEDICINE CLINIC | Facility: CLINIC | Age: 34
End: 2019-04-12

## 2019-04-12 VITALS
WEIGHT: 170 LBS | DIASTOLIC BLOOD PRESSURE: 76 MMHG | OXYGEN SATURATION: 99 % | HEART RATE: 80 BPM | HEIGHT: 69 IN | SYSTOLIC BLOOD PRESSURE: 120 MMHG | BODY MASS INDEX: 25.18 KG/M2

## 2019-04-12 DIAGNOSIS — Z13.1 DIABETES MELLITUS SCREENING: ICD-10-CM

## 2019-04-12 DIAGNOSIS — Z00.00 ANNUAL PHYSICAL EXAM: ICD-10-CM

## 2019-04-12 DIAGNOSIS — Z23 FLU VACCINE NEED: ICD-10-CM

## 2019-04-12 DIAGNOSIS — Z13.220 SCREENING FOR HYPERLIPIDEMIA: Primary | ICD-10-CM

## 2019-04-12 PROCEDURE — 99395 PREV VISIT EST AGE 18-39: CPT | Performed by: FAMILY MEDICINE

## 2019-04-12 PROCEDURE — 90674 CCIIV4 VAC NO PRSV 0.5 ML IM: CPT | Performed by: FAMILY MEDICINE

## 2019-04-12 PROCEDURE — 90471 IMMUNIZATION ADMIN: CPT | Performed by: FAMILY MEDICINE

## 2019-04-12 RX ORDER — TRIAMCINOLONE ACETONIDE 0.25 MG/G
CREAM TOPICAL 2 TIMES DAILY
Qty: 15 G | Refills: 11 | Status: SHIPPED | OUTPATIENT
Start: 2019-04-12 | End: 2019-09-20 | Stop reason: SDUPTHER

## 2019-04-12 NOTE — PROGRESS NOTES
Subjective   Hammad Carpenter is a 33 y.o. male.   Chief complaint-patient here for an annual exam  History of Present Illness     The following portions of the patient's history were reviewed and updated as appropriate: allergies, current medications, past family history, past medical history, past social history, past surgical history and problem list.    Review of Systems   Constitutional: Negative for chills, fever and unexpected weight change.   HENT: Positive for postnasal drip, rhinorrhea and sinus pain. Negative for ear pain, sinus pressure and sore throat.    Eyes: Negative for discharge and itching.   Respiratory: Negative for cough, shortness of breath and wheezing.    Cardiovascular: Negative for chest pain, palpitations and leg swelling.   Gastrointestinal: Negative for abdominal distention, abdominal pain, anal bleeding, blood in stool, constipation, diarrhea, nausea and vomiting.   Endocrine: Negative for cold intolerance, heat intolerance, polydipsia and polyuria.   Genitourinary: Negative for dysuria and hematuria.   Musculoskeletal: Negative for arthralgias, back pain, myalgias and neck pain.   Skin: Positive for rash. Negative for wound.   Allergic/Immunologic: Negative for environmental allergies and food allergies.   Neurological: Negative for seizures, syncope and headaches.   Hematological: Negative for adenopathy. Does not bruise/bleed easily.   Psychiatric/Behavioral: Negative for dysphoric mood and sleep disturbance. The patient is not nervous/anxious.        Objective   Physical Exam   Constitutional: He is oriented to person, place, and time. He appears well-developed and well-nourished. No distress.   HENT:   Head: Normocephalic and atraumatic.   Right Ear: Hearing, tympanic membrane and external ear normal.   Left Ear: Hearing, tympanic membrane and external ear normal.   Nose: Mucosal edema and rhinorrhea present.   Mouth/Throat: Oropharynx is clear and moist and mucous membranes  are normal. No oropharyngeal exudate.   Eyes: Lids are normal. Lids are everted and swept, no foreign bodies found. Right conjunctiva is not injected. Right conjunctiva has no hemorrhage. Left conjunctiva is not injected. Left conjunctiva has no hemorrhage.   Neck: No tracheal tenderness present. Carotid bruit is not present. No tracheal deviation present. No thyroid mass and no thyromegaly present.   Cardiovascular: Normal rate and regular rhythm. Exam reveals no gallop, no S4, no distant heart sounds and no friction rub.   Pulmonary/Chest: Effort normal and breath sounds normal.   Abdominal: There is no hepatosplenomegaly. There is no tenderness.     Vascular Status -  His right foot exhibits no edema. His left foot exhibits no edema.  Lymphadenopathy:        Head (right side): No submental and no submandibular adenopathy present.        Head (left side): No submental and no submandibular adenopathy present.     He has no cervical adenopathy.   Neurological: He is alert and oriented to person, place, and time.   Skin: Skin is warm and dry. Rash noted. He is not diaphoretic.        Psychiatric: He has a normal mood and affect. His speech is normal and behavior is normal. Judgment and thought content normal.   Nursing note and vitals reviewed.      Assessment/Plan   Problems Addressed this Visit     None      Visit Diagnoses     Screening for hyperlipidemia    -  Primary    Relevant Orders    Lipid Panel    Diabetes mellitus screening        Relevant Orders    Comprehensive Metabolic Panel    Annual physical exam        Flu vaccine need        Relevant Orders    Flucelvax Quad (Vial) =>4 yrs (0157-4570) (Completed)        Use steroid cream sparingly on face and maximum 2 weeks per application       waist circ 34 inches.  Patient is here for an annual exam.

## 2019-05-10 ENCOUNTER — APPOINTMENT (OUTPATIENT)
Dept: LAB | Facility: HOSPITAL | Age: 34
End: 2019-05-10

## 2019-05-10 LAB
ALBUMIN SERPL-MCNC: 4.8 G/DL (ref 3.5–5.2)
ALBUMIN/GLOB SERPL: 2.3 G/DL
ALP SERPL-CCNC: 98 U/L (ref 39–117)
ALT SERPL W P-5'-P-CCNC: 22 U/L (ref 1–41)
ANION GAP SERPL CALCULATED.3IONS-SCNC: 11.5 MMOL/L
AST SERPL-CCNC: 22 U/L (ref 1–40)
BILIRUB SERPL-MCNC: 0.5 MG/DL (ref 0.2–1.2)
BUN BLD-MCNC: 18 MG/DL (ref 6–20)
BUN/CREAT SERPL: 20.5 (ref 7–25)
CALCIUM SPEC-SCNC: 8.9 MG/DL (ref 8.6–10.5)
CHLORIDE SERPL-SCNC: 100 MMOL/L (ref 98–107)
CHOLEST SERPL-MCNC: 145 MG/DL (ref 0–200)
CO2 SERPL-SCNC: 28.5 MMOL/L (ref 22–29)
CREAT BLD-MCNC: 0.88 MG/DL (ref 0.76–1.27)
GFR SERPL CREATININE-BSD FRML MDRD: 99 ML/MIN/1.73
GLOBULIN UR ELPH-MCNC: 2.1 GM/DL
GLUCOSE BLD-MCNC: 92 MG/DL (ref 65–99)
HDLC SERPL-MCNC: 71 MG/DL (ref 40–60)
LDLC SERPL CALC-MCNC: 61 MG/DL (ref 0–100)
LDLC/HDLC SERPL: 0.85 {RATIO}
POTASSIUM BLD-SCNC: 4.1 MMOL/L (ref 3.5–5.2)
PROT SERPL-MCNC: 6.9 G/DL (ref 6–8.5)
SODIUM BLD-SCNC: 140 MMOL/L (ref 136–145)
TRIGL SERPL-MCNC: 67 MG/DL (ref 0–150)
VLDLC SERPL-MCNC: 13.4 MG/DL (ref 5–40)

## 2019-05-10 PROCEDURE — 36415 COLL VENOUS BLD VENIPUNCTURE: CPT | Performed by: FAMILY MEDICINE

## 2019-05-10 PROCEDURE — 80053 COMPREHEN METABOLIC PANEL: CPT | Performed by: FAMILY MEDICINE

## 2019-05-10 PROCEDURE — 80061 LIPID PANEL: CPT | Performed by: FAMILY MEDICINE

## 2019-05-14 ENCOUNTER — TELEPHONE (OUTPATIENT)
Dept: FAMILY MEDICINE CLINIC | Facility: CLINIC | Age: 34
End: 2019-05-14

## 2019-05-14 NOTE — TELEPHONE ENCOUNTER
Per Dr. Pizarro, Mr. Carpenter has been called with recent lab results & recommendations.  Continue current medications and follow-up as planned or sooner if any problems.      ----- Message from Govind Pizarro MD sent at 5/12/2019 12:45 PM CDT -----  Ok, call or send card.

## 2019-05-28 ENCOUNTER — TELEPHONE (OUTPATIENT)
Dept: FAMILY MEDICINE CLINIC | Facility: CLINIC | Age: 34
End: 2019-05-28

## 2019-05-28 RX ORDER — DEXTROAMPHETAMINE SACCHARATE, AMPHETAMINE ASPARTATE, DEXTROAMPHETAMINE SULFATE AND AMPHETAMINE SULFATE 2.5; 2.5; 2.5; 2.5 MG/1; MG/1; MG/1; MG/1
10 TABLET ORAL 3 TIMES DAILY
Qty: 90 TABLET | Refills: 0 | Status: SHIPPED | OUTPATIENT
Start: 2019-05-28 | End: 2019-06-26 | Stop reason: SDUPTHER

## 2019-06-26 ENCOUNTER — OFFICE VISIT (OUTPATIENT)
Dept: FAMILY MEDICINE CLINIC | Facility: CLINIC | Age: 34
End: 2019-06-26

## 2019-06-26 VITALS
HEART RATE: 76 BPM | BODY MASS INDEX: 24.59 KG/M2 | HEIGHT: 69 IN | WEIGHT: 166 LBS | OXYGEN SATURATION: 98 % | SYSTOLIC BLOOD PRESSURE: 120 MMHG | DIASTOLIC BLOOD PRESSURE: 74 MMHG

## 2019-06-26 DIAGNOSIS — F90.0 ADHD, PREDOMINANTLY INATTENTIVE TYPE: ICD-10-CM

## 2019-06-26 DIAGNOSIS — G40.909 SEIZURE DISORDER (HCC): ICD-10-CM

## 2019-06-26 DIAGNOSIS — M25.511 ACUTE PAIN OF RIGHT SHOULDER: Primary | ICD-10-CM

## 2019-06-26 DIAGNOSIS — F51.01 PRIMARY INSOMNIA: ICD-10-CM

## 2019-06-26 PROCEDURE — 99214 OFFICE O/P EST MOD 30 MIN: CPT | Performed by: FAMILY MEDICINE

## 2019-06-26 PROCEDURE — 96372 THER/PROPH/DIAG INJ SC/IM: CPT | Performed by: FAMILY MEDICINE

## 2019-06-26 RX ORDER — DEXTROAMPHETAMINE SACCHARATE, AMPHETAMINE ASPARTATE, DEXTROAMPHETAMINE SULFATE AND AMPHETAMINE SULFATE 2.5; 2.5; 2.5; 2.5 MG/1; MG/1; MG/1; MG/1
10 TABLET ORAL 3 TIMES DAILY
Qty: 90 TABLET | Refills: 0 | Status: SHIPPED | OUTPATIENT
Start: 2019-06-26 | End: 2019-08-26 | Stop reason: SDUPTHER

## 2019-06-26 RX ORDER — TRIAMCINOLONE ACETONIDE 40 MG/ML
80 INJECTION, SUSPENSION INTRA-ARTICULAR; INTRAMUSCULAR ONCE
Status: COMPLETED | OUTPATIENT
Start: 2019-06-26 | End: 2019-06-26

## 2019-06-26 RX ORDER — DEXTROAMPHETAMINE SACCHARATE, AMPHETAMINE ASPARTATE, DEXTROAMPHETAMINE SULFATE AND AMPHETAMINE SULFATE 2.5; 2.5; 2.5; 2.5 MG/1; MG/1; MG/1; MG/1
10 TABLET ORAL 3 TIMES DAILY
Qty: 90 TABLET | Refills: 0 | Status: SHIPPED | OUTPATIENT
Start: 2019-06-26 | End: 2019-06-26 | Stop reason: SDUPTHER

## 2019-06-26 RX ORDER — TRAZODONE HYDROCHLORIDE 100 MG/1
100 TABLET ORAL NIGHTLY
Qty: 30 TABLET | Refills: 11 | OUTPATIENT
Start: 2019-06-26 | End: 2020-11-27

## 2019-06-26 RX ADMIN — TRIAMCINOLONE ACETONIDE 80 MG: 40 INJECTION, SUSPENSION INTRA-ARTICULAR; INTRAMUSCULAR at 10:56

## 2019-06-26 NOTE — PROGRESS NOTES
"Allison Carpenter is a 33 y.o. male.     Seizures    This is a chronic (none rescently) problem. The problem has not changed since onset.  Shoulder Injury    There was no injury mechanism. The quality of the pain is described as aching. The pain does not radiate. The pain is mild. The symptoms are aggravated by movement. He has tried nothing for the symptoms. The treatment provided no relief.      compliance with medication regimen  often fidgets with hands or feet or squirms in seat -no , is often \"on the go\" or often acts as if \"driven by a motor\" -no  and often talks excessively - no     often has difficulty awaiting turn - no  and often interrupts or intrudes on others - no      Focus issues at noon. Will change to tid    The following portions of the patient's history were reviewed and updated as appropriate: allergies, current medications, past family history, past medical history, past social history, past surgical history and problem list.    Review of Systems   Cardiovascular: Negative for palpitations.   Neurological: Positive for seizures.   Psychiatric/Behavioral: Positive for sleep disturbance.       Objective   Physical Exam   Constitutional: He is oriented to person, place, and time. He appears well-developed and well-nourished. No distress.   HENT:   Head: Normocephalic and atraumatic.   Cardiovascular: Normal rate, regular rhythm and normal heart sounds.   Pulmonary/Chest: Effort normal and breath sounds normal.   Musculoskeletal:        Right shoulder: He exhibits tenderness. He exhibits normal range of motion, no bony tenderness, no swelling, no effusion, no crepitus, no deformity, no laceration, no pain and no spasm.   Neurological: He is alert and oriented to person, place, and time. No cranial nerve deficit or sensory deficit.   Reflex Scores:       Patellar reflexes are 2+ on the right side and 2+ on the left side.  Skin: Skin is warm and dry. He is not diaphoretic. "   Psychiatric: He has a normal mood and affect. His behavior is normal. Judgment and thought content normal.   Nursing note and vitals reviewed.      Assessment/Plan   Problems Addressed this Visit        Other    ADHD, predominantly inattentive type    Relevant Medications    amphetamine-dextroamphetamine (ADDERALL) 10 MG tablet    traZODone (DESYREL) 100 MG tablet      Other Visit Diagnoses     Acute pain of right shoulder    -  Primary    Relevant Medications    triamcinolone acetonide (KENALOG-40) injection 80 mg    Seizure disorder (CMS/HCC)        Primary insomnia

## 2019-08-22 RX ORDER — LAMOTRIGINE 200 MG/1
TABLET ORAL
Qty: 60 TABLET | Refills: 2 | Status: SHIPPED | OUTPATIENT
Start: 2019-08-22 | End: 2019-12-17 | Stop reason: SDUPTHER

## 2019-08-22 RX ORDER — LEVETIRACETAM 750 MG/1
TABLET ORAL
Qty: 60 TABLET | Refills: 4 | Status: SHIPPED | OUTPATIENT
Start: 2019-08-22 | End: 2019-12-20 | Stop reason: SDUPTHER

## 2019-08-26 RX ORDER — DEXTROAMPHETAMINE SACCHARATE, AMPHETAMINE ASPARTATE, DEXTROAMPHETAMINE SULFATE AND AMPHETAMINE SULFATE 2.5; 2.5; 2.5; 2.5 MG/1; MG/1; MG/1; MG/1
10 TABLET ORAL 3 TIMES DAILY
Qty: 90 TABLET | Refills: 0 | Status: SHIPPED | OUTPATIENT
Start: 2019-08-26 | End: 2019-09-20 | Stop reason: SDUPTHER

## 2019-09-20 ENCOUNTER — OFFICE VISIT (OUTPATIENT)
Dept: FAMILY MEDICINE CLINIC | Facility: CLINIC | Age: 34
End: 2019-09-20

## 2019-09-20 VITALS
BODY MASS INDEX: 23.85 KG/M2 | HEART RATE: 79 BPM | HEIGHT: 69 IN | DIASTOLIC BLOOD PRESSURE: 72 MMHG | WEIGHT: 161 LBS | OXYGEN SATURATION: 98 % | SYSTOLIC BLOOD PRESSURE: 120 MMHG

## 2019-09-20 DIAGNOSIS — F90.0 ADHD, PREDOMINANTLY INATTENTIVE TYPE: ICD-10-CM

## 2019-09-20 DIAGNOSIS — G40.909 SEIZURE DISORDER (HCC): ICD-10-CM

## 2019-09-20 DIAGNOSIS — Z23 NEED FOR HEPATITIS A VACCINATION: Primary | ICD-10-CM

## 2019-09-20 PROCEDURE — 90632 HEPA VACCINE ADULT IM: CPT | Performed by: FAMILY MEDICINE

## 2019-09-20 PROCEDURE — 99213 OFFICE O/P EST LOW 20 MIN: CPT | Performed by: FAMILY MEDICINE

## 2019-09-20 PROCEDURE — 90471 IMMUNIZATION ADMIN: CPT | Performed by: FAMILY MEDICINE

## 2019-09-20 RX ORDER — DEXTROAMPHETAMINE SACCHARATE, AMPHETAMINE ASPARTATE, DEXTROAMPHETAMINE SULFATE AND AMPHETAMINE SULFATE 2.5; 2.5; 2.5; 2.5 MG/1; MG/1; MG/1; MG/1
10 TABLET ORAL 3 TIMES DAILY
Qty: 90 TABLET | Refills: 0 | Status: SHIPPED | OUTPATIENT
Start: 2019-09-20 | End: 2019-09-20 | Stop reason: SDUPTHER

## 2019-09-20 RX ORDER — DEXTROAMPHETAMINE SACCHARATE, AMPHETAMINE ASPARTATE, DEXTROAMPHETAMINE SULFATE AND AMPHETAMINE SULFATE 2.5; 2.5; 2.5; 2.5 MG/1; MG/1; MG/1; MG/1
10 TABLET ORAL 3 TIMES DAILY
Qty: 90 TABLET | Refills: 0 | Status: SHIPPED | OUTPATIENT
Start: 2019-09-20 | End: 2019-11-21 | Stop reason: SDUPTHER

## 2019-09-20 RX ORDER — TRIAMCINOLONE ACETONIDE 0.25 MG/G
CREAM TOPICAL 2 TIMES DAILY
Qty: 15 G | Refills: 11 | OUTPATIENT
Start: 2019-09-20 | End: 2020-01-07

## 2019-09-20 NOTE — PROGRESS NOTES
"Allison Carpenter is a 33 y.o. male.     Seizures    This is a chronic (none rescently) problem. The problem has not changed since onset.Associated symptoms include chest pain (tihgtness 2-3 months ago resolved).      compliance with medication regimen  often fidgets with hands or feet or squirms in seat -no , is often \"on the go\" or often acts as if \"driven by a motor\" -no  and often talks excessively - no     often has difficulty awaiting turn - no  and often interrupts or intrudes on others - no      Focus issues at noon. change to tid helped    The following portions of the patient's history were reviewed and updated as appropriate: allergies, current medications, past family history, past medical history, past social history, past surgical history and problem list.    Review of Systems   Cardiovascular: Positive for chest pain (tihgtness 2-3 months ago resolved). Negative for palpitations.   Neurological: Positive for seizures.   Psychiatric/Behavioral: Positive for sleep disturbance.       Objective   Physical Exam   Constitutional: He is oriented to person, place, and time. He appears well-developed and well-nourished. No distress.   HENT:   Head: Normocephalic and atraumatic.   Cardiovascular: Normal rate, regular rhythm and normal heart sounds.   Pulmonary/Chest: Effort normal and breath sounds normal.   Musculoskeletal:        Right shoulder: He exhibits tenderness. He exhibits normal range of motion, no bony tenderness, no swelling, no effusion, no crepitus, no deformity, no laceration, no pain and no spasm.   Neurological: He is alert and oriented to person, place, and time. No cranial nerve deficit or sensory deficit.   Reflex Scores:       Patellar reflexes are 2+ on the right side and 2+ on the left side.  Skin: Skin is warm and dry. He is not diaphoretic.   Psychiatric: He has a normal mood and affect. His behavior is normal. Judgment and thought content normal.   Nursing note and " vitals reviewed.      Assessment/Plan   Problems Addressed this Visit        Other    ADHD, predominantly inattentive type    Relevant Medications    amphetamine-dextroamphetamine (ADDERALL) 10 MG tablet      Other Visit Diagnoses     Need for hepatitis A vaccination    -  Primary    Relevant Orders    Hepatitis A Vaccine Adult IM (Completed)    Seizure disorder (CMS/HCC)

## 2019-11-21 ENCOUNTER — TELEPHONE (OUTPATIENT)
Dept: FAMILY MEDICINE CLINIC | Facility: CLINIC | Age: 34
End: 2019-11-21

## 2019-11-21 RX ORDER — DEXTROAMPHETAMINE SACCHARATE, AMPHETAMINE ASPARTATE, DEXTROAMPHETAMINE SULFATE AND AMPHETAMINE SULFATE 2.5; 2.5; 2.5; 2.5 MG/1; MG/1; MG/1; MG/1
10 TABLET ORAL 3 TIMES DAILY
Qty: 90 TABLET | Refills: 0 | Status: SHIPPED | OUTPATIENT
Start: 2019-11-21 | End: 2019-12-20 | Stop reason: SDUPTHER

## 2019-11-21 NOTE — TELEPHONE ENCOUNTER
WIFE CALLED AND STATES HIS 3RD amphetamine-dextroamphetamine (ADDERALL) 10 MG tablet RX IS . THE PHARMACY GAVE THE RX BACK TO HER. SHE STATES SHE WOULD LIKE DR MOYER TO WRITE OUT A NEW RX THAT WAY SHE CAN EXCHANGE THE RX. CALL HER -[phone] WHEN WRITTEN

## 2019-12-17 RX ORDER — LAMOTRIGINE 200 MG/1
TABLET ORAL
Qty: 60 TABLET | Refills: 1 | Status: SHIPPED | OUTPATIENT
Start: 2019-12-17 | End: 2020-02-17

## 2019-12-20 ENCOUNTER — OFFICE VISIT (OUTPATIENT)
Dept: FAMILY MEDICINE CLINIC | Facility: CLINIC | Age: 34
End: 2019-12-20

## 2019-12-20 VITALS
HEART RATE: 80 BPM | WEIGHT: 168 LBS | OXYGEN SATURATION: 98 % | HEIGHT: 69 IN | DIASTOLIC BLOOD PRESSURE: 76 MMHG | BODY MASS INDEX: 24.88 KG/M2 | SYSTOLIC BLOOD PRESSURE: 124 MMHG

## 2019-12-20 DIAGNOSIS — F90.0 ADHD, PREDOMINANTLY INATTENTIVE TYPE: Primary | ICD-10-CM

## 2019-12-20 DIAGNOSIS — G40.909 SEIZURE DISORDER (HCC): ICD-10-CM

## 2019-12-20 DIAGNOSIS — M25.511 ACUTE PAIN OF RIGHT SHOULDER: ICD-10-CM

## 2019-12-20 PROCEDURE — 96372 THER/PROPH/DIAG INJ SC/IM: CPT | Performed by: FAMILY MEDICINE

## 2019-12-20 PROCEDURE — 99214 OFFICE O/P EST MOD 30 MIN: CPT | Performed by: FAMILY MEDICINE

## 2019-12-20 RX ORDER — LEVETIRACETAM 750 MG/1
TABLET ORAL
Qty: 60 TABLET | Refills: 4 | Status: SHIPPED | OUTPATIENT
Start: 2019-12-20 | End: 2020-04-13 | Stop reason: SDUPTHER

## 2019-12-20 RX ORDER — TRIAMCINOLONE ACETONIDE 40 MG/ML
80 INJECTION, SUSPENSION INTRA-ARTICULAR; INTRAMUSCULAR ONCE
Status: COMPLETED | OUTPATIENT
Start: 2019-12-20 | End: 2019-12-20

## 2019-12-20 RX ORDER — DEXTROAMPHETAMINE SACCHARATE, AMPHETAMINE ASPARTATE, DEXTROAMPHETAMINE SULFATE AND AMPHETAMINE SULFATE 2.5; 2.5; 2.5; 2.5 MG/1; MG/1; MG/1; MG/1
10 TABLET ORAL 3 TIMES DAILY
Qty: 90 TABLET | Refills: 0 | Status: SHIPPED | OUTPATIENT
Start: 2019-12-20 | End: 2019-12-20 | Stop reason: SDUPTHER

## 2019-12-20 RX ORDER — DEXTROAMPHETAMINE SACCHARATE, AMPHETAMINE ASPARTATE, DEXTROAMPHETAMINE SULFATE AND AMPHETAMINE SULFATE 2.5; 2.5; 2.5; 2.5 MG/1; MG/1; MG/1; MG/1
10 TABLET ORAL 3 TIMES DAILY
Qty: 90 TABLET | Refills: 0 | Status: SHIPPED | OUTPATIENT
Start: 2019-12-20 | End: 2020-03-02 | Stop reason: SDUPTHER

## 2019-12-20 RX ADMIN — TRIAMCINOLONE ACETONIDE 80 MG: 40 INJECTION, SUSPENSION INTRA-ARTICULAR; INTRAMUSCULAR at 10:55

## 2019-12-20 NOTE — PROGRESS NOTES
"Allison Carpenter is a 34 y.o. male.     Seizures    This is a chronic (none rescently) problem. The current episode started more than 1 week ago. The problem has not changed since onset.  Shoulder Injury    The right shoulder is affected. There was no injury mechanism. The quality of the pain is described as aching. The pain does not radiate. The pain is mild. The symptoms are aggravated by movement. He has tried nothing for the symptoms. The treatment provided no relief.      compliance with medication regimen  often fidgets with hands or feet or squirms in seat -no , is often \"on the go\" or often acts as if \"driven by a motor\" -no  and often talks excessively - no     often has difficulty awaiting turn - no  and often interrupts or intrudes on others - no      Focus issues at noon.  Have changed to 3 times daily dosing and that has improved    The following portions of the patient's history were reviewed and updated as appropriate: allergies, current medications, past family history, past medical history, past social history, past surgical history and problem list.    Review of Systems   Cardiovascular: Negative for palpitations.   Neurological: Positive for seizures.   Psychiatric/Behavioral: Positive for sleep disturbance.       Objective   Physical Exam   Constitutional: He is oriented to person, place, and time. He appears well-developed and well-nourished. No distress.   HENT:   Head: Normocephalic and atraumatic.   Cardiovascular: Normal rate, regular rhythm and normal heart sounds.   Pulmonary/Chest: Effort normal and breath sounds normal.   Musculoskeletal:        Right shoulder: He exhibits tenderness. He exhibits normal range of motion, no bony tenderness, no swelling, no effusion, no crepitus, no deformity, no laceration, no pain and no spasm.   Neurological: He is alert and oriented to person, place, and time. No cranial nerve deficit or sensory deficit.   Reflex Scores:       " "Patellar reflexes are 2+ on the right side and 2+ on the left side.  Skin: Skin is warm and dry. He is not diaphoretic.   Psychiatric: He has a normal mood and affect. His behavior is normal. Judgment and thought content normal.   Nursing note and vitals reviewed.      Assessment/Plan   Problems Addressed this Visit        Other    ADHD, predominantly inattentive type - Primary    Relevant Medications    amphetamine-dextroamphetamine (ADDERALL) 10 MG tablet      Other Visit Diagnoses     Acute pain of right shoulder        Relevant Medications    triamcinolone acetonide (KENALOG-40) injection 80 mg (Completed)    Seizure disorder (CMS/HCC)        Relevant Medications    levETIRAcetam (KEPPRA) 750 MG tablet                     Visit Vitals  /76   Pulse 80   Ht 175.3 cm (69\")   Wt 76.2 kg (168 lb)   SpO2 98%   BMI 24.81 kg/m²       Body mass index is 24.81 kg/m².            This document has been electronically signed by Govind Pizarro MD on December 23, 2019 1:01 PM    "

## 2020-02-17 RX ORDER — LAMOTRIGINE 200 MG/1
TABLET ORAL
Qty: 60 TABLET | Refills: 1 | Status: SHIPPED | OUTPATIENT
Start: 2020-02-17 | End: 2020-04-13 | Stop reason: SDUPTHER

## 2020-03-02 ENCOUNTER — TELEPHONE (OUTPATIENT)
Dept: FAMILY MEDICINE CLINIC | Facility: CLINIC | Age: 35
End: 2020-03-02

## 2020-03-02 DIAGNOSIS — F90.0 ADHD, PREDOMINANTLY INATTENTIVE TYPE: ICD-10-CM

## 2020-03-02 RX ORDER — DEXTROAMPHETAMINE SACCHARATE, AMPHETAMINE ASPARTATE, DEXTROAMPHETAMINE SULFATE AND AMPHETAMINE SULFATE 2.5; 2.5; 2.5; 2.5 MG/1; MG/1; MG/1; MG/1
10 TABLET ORAL 3 TIMES DAILY
Qty: 90 TABLET | Refills: 0 | Status: SHIPPED | OUTPATIENT
Start: 2020-03-02 | End: 2020-03-20 | Stop reason: SDUPTHER

## 2020-03-02 NOTE — TELEPHONE ENCOUNTER
Needs his adderakk rx and needs someone to let his wife know when this has been done.   [phone]

## 2020-03-19 ENCOUNTER — TELEPHONE (OUTPATIENT)
Dept: FAMILY MEDICINE CLINIC | Facility: CLINIC | Age: 35
End: 2020-03-19

## 2020-03-19 NOTE — TELEPHONE ENCOUNTER
Canceled his apt for tomorrow.  Needs his rx's sent to [facility].  [phone]  Wife Polly's number

## 2020-03-20 DIAGNOSIS — F90.0 ADHD, PREDOMINANTLY INATTENTIVE TYPE: ICD-10-CM

## 2020-03-20 RX ORDER — DEXTROAMPHETAMINE SACCHARATE, AMPHETAMINE ASPARTATE, DEXTROAMPHETAMINE SULFATE AND AMPHETAMINE SULFATE 2.5; 2.5; 2.5; 2.5 MG/1; MG/1; MG/1; MG/1
10 TABLET ORAL 3 TIMES DAILY
Qty: 90 TABLET | Refills: 0 | Status: SHIPPED | OUTPATIENT
Start: 2020-03-20 | End: 2020-04-15

## 2020-04-13 RX ORDER — LEVETIRACETAM 750 MG/1
TABLET ORAL
Qty: 60 TABLET | Refills: 0 | Status: SHIPPED | OUTPATIENT
Start: 2020-04-13 | End: 2020-12-22 | Stop reason: SDUPTHER

## 2020-04-13 RX ORDER — LAMOTRIGINE 200 MG/1
200 TABLET ORAL 2 TIMES DAILY
Qty: 60 TABLET | Refills: 0 | Status: SHIPPED | OUTPATIENT
Start: 2020-04-13 | End: 2020-07-22

## 2020-04-13 NOTE — TELEPHONE ENCOUNTER
Patient called in requesting a refill lamoTRIgine (LaMICtal) 200 MG tablet and levETIRAcetam (KEPPRA) 750 MG tablet.     Pharmacy confirmed.

## 2020-04-15 ENCOUNTER — OFFICE VISIT (OUTPATIENT)
Dept: FAMILY MEDICINE CLINIC | Facility: CLINIC | Age: 35
End: 2020-04-15

## 2020-04-15 VITALS — HEIGHT: 71 IN | WEIGHT: 160 LBS | BODY MASS INDEX: 22.4 KG/M2

## 2020-04-15 DIAGNOSIS — F90.0 ADHD, PREDOMINANTLY INATTENTIVE TYPE: Primary | ICD-10-CM

## 2020-04-15 DIAGNOSIS — G40.909 SEIZURE DISORDER (HCC): ICD-10-CM

## 2020-04-15 PROCEDURE — 99213 OFFICE O/P EST LOW 20 MIN: CPT | Performed by: FAMILY MEDICINE

## 2020-04-15 RX ORDER — DEXTROAMPHETAMINE SACCHARATE, AMPHETAMINE ASPARTATE, DEXTROAMPHETAMINE SULFATE AND AMPHETAMINE SULFATE 2.5; 2.5; 2.5; 2.5 MG/1; MG/1; MG/1; MG/1
10 TABLET ORAL 3 TIMES DAILY
Qty: 90 TABLET | Refills: 0 | Status: SHIPPED | OUTPATIENT
Start: 2020-04-15 | End: 2020-05-15

## 2020-04-15 RX ORDER — DEXTROAMPHETAMINE SACCHARATE, AMPHETAMINE ASPARTATE, DEXTROAMPHETAMINE SULFATE AND AMPHETAMINE SULFATE 2.5; 2.5; 2.5; 2.5 MG/1; MG/1; MG/1; MG/1
10 TABLET ORAL 3 TIMES DAILY
Qty: 90 TABLET | Refills: 0 | Status: SHIPPED | OUTPATIENT
Start: 2020-04-15 | End: 2020-07-06

## 2020-04-15 NOTE — PROGRESS NOTES
"Allison Carpenter is a 34 y.o. male.     Seizures    This is a chronic (none rescently) problem. The current episode started more than 1 week ago. The problem has not changed since onset.     compliance with medication regimen  often fidgets with hands or feet or squirms in seat -no , is often \"on the go\" or often acts as if \"driven by a motor\" -no  and often talks excessively - no     often has difficulty awaiting turn - no  and often interrupts or intrudes on others - no      The following portions of the patient's history were reviewed and updated as appropriate: allergies, current medications, past family history, past medical history, past social history, past surgical history and problem list.    Review of Systems   Cardiovascular: Negative for palpitations.   Neurological: Positive for seizures.   Psychiatric/Behavioral: Positive for sleep disturbance.       Objective   Physical Exam   Skin: Skin is warm and dry.   Psychiatric: He has a normal mood and affect. His behavior is normal. Judgment and thought content normal.       Assessment/Plan   Problems Addressed this Visit        Other    ADHD, predominantly inattentive type - Primary    Relevant Medications    amphetamine-dextroamphetamine (Adderall) 10 MG tablet    amphetamine-dextroamphetamine (Adderall) 10 MG tablet    amphetamine-dextroamphetamine (Adderall) 10 MG tablet      Other Visit Diagnoses     Seizure disorder (CMS/[facility])              Unable to complete visit using a video connection to the patient. A phone visit was used to complete this visits. Total time of discussion was 11 minutes.       You have chosen to receive care through a telephone visit. Do you consent to use a telephone visit for your medical care today? Yes      Visit Vitals  Ht 180.3 cm (71\")   Wt 72.6 kg (160 lb)   BMI 22.32 kg/m²       Body mass index is 22.32 kg/m².            This document has been electronically signed by Govind Pizarro MD on April 15, 2020 " 15:35

## 2020-07-02 DIAGNOSIS — F90.0 ADHD, PREDOMINANTLY INATTENTIVE TYPE: Primary | ICD-10-CM

## 2020-07-02 RX ORDER — DEXTROAMPHETAMINE SACCHARATE, AMPHETAMINE ASPARTATE, DEXTROAMPHETAMINE SULFATE AND AMPHETAMINE SULFATE 2.5; 2.5; 2.5; 2.5 MG/1; MG/1; MG/1; MG/1
10 TABLET ORAL DAILY
Qty: 30 TABLET | Refills: 0 | Status: SHIPPED | OUTPATIENT
Start: 2020-07-02 | End: 2020-07-06

## 2020-07-02 NOTE — TELEPHONE ENCOUNTER
Dr. Pizarro,     Mr. Carpenter has called requesting a refill on his Adderall 10 mg #90        Last OV    04/15/2020    Next Jovanna OV    2020    Last Script Written  3 scripts sent 04/15/2020  #90 with No Refills  ( They could only use 2 of the 3 scripts because they  after 60 days.   Last Refill at [facility] was on 2020      Last Gilberto     04/15/2020    Please advise on refill    Thank you

## 2020-07-02 NOTE — TELEPHONE ENCOUNTER
Caller: Hammad Carpenter Chandler    Relationship: Self    Best call back number: [phone]    Medication needed:   Requested Prescriptions     Pending Prescriptions Disp Refills   • amphetamine-dextroamphetamine (ADDERALL) 10 MG tablet 30 tablet 0     Sig: Take 1 tablet by mouth Daily for 30 days.       When do you need the refill by: 07/02/2020    What details did the patient provide when requesting the medication: PATIENT IS OUT    Does the patient have less than a 3 day supply:  [x] Yes  [] No    What is the patient's preferred pharmacy: [facility] [place] [address] AT [facility] [address] - [phone]  - [phone] FX

## 2020-07-03 ENCOUNTER — TELEPHONE (OUTPATIENT)
Dept: FAMILY MEDICINE CLINIC | Facility: CLINIC | Age: 35
End: 2020-07-03

## 2020-07-03 NOTE — TELEPHONE ENCOUNTER
Received call from patient as on call provider.  He notes that he went to  his adderall prescription for ADHD.  Has consistently been getting Adderall 10 mg 3 times daily for the last several months (90 tablets).  When he went to  new prescription, it was for Adderall 10 mg daily (30 tablets). Discussed with patient that it was likely just a mistake if he did not have a discussion with PCP about this change.  He has enough medication to last for the weekend.  Please contact patient on Monday if possible to discuss further.  Thanks, OUMOU Low

## 2020-07-06 DIAGNOSIS — F90.0 ADHD, PREDOMINANTLY INATTENTIVE TYPE: ICD-10-CM

## 2020-07-06 RX ORDER — DEXTROAMPHETAMINE SACCHARATE, AMPHETAMINE ASPARTATE, DEXTROAMPHETAMINE SULFATE AND AMPHETAMINE SULFATE 2.5; 2.5; 2.5; 2.5 MG/1; MG/1; MG/1; MG/1
10 TABLET ORAL 3 TIMES DAILY
Qty: 90 TABLET | Refills: 0 | Status: SHIPPED | OUTPATIENT
Start: 2020-07-06 | End: 2020-07-13 | Stop reason: SDUPTHER

## 2020-07-13 ENCOUNTER — TELEMEDICINE (OUTPATIENT)
Dept: FAMILY MEDICINE CLINIC | Facility: CLINIC | Age: 35
End: 2020-07-13

## 2020-07-13 ENCOUNTER — LAB (OUTPATIENT)
Dept: LAB | Facility: HOSPITAL | Age: 35
End: 2020-07-13

## 2020-07-13 DIAGNOSIS — G40.909 SEIZURE DISORDER (HCC): Primary | ICD-10-CM

## 2020-07-13 DIAGNOSIS — F90.0 ADHD, PREDOMINANTLY INATTENTIVE TYPE: ICD-10-CM

## 2020-07-13 LAB
ALBUMIN SERPL-MCNC: 4.7 G/DL (ref 3.5–5.2)
ALBUMIN/GLOB SERPL: 2 G/DL
ALP SERPL-CCNC: 94 U/L (ref 39–117)
ALT SERPL W P-5'-P-CCNC: 32 U/L (ref 1–41)
ANION GAP SERPL CALCULATED.3IONS-SCNC: 11.3 MMOL/L (ref 5–15)
AST SERPL-CCNC: 22 U/L (ref 1–40)
BASOPHILS # BLD AUTO: 0.05 10*3/MM3 (ref 0–0.2)
BASOPHILS NFR BLD AUTO: 0.8 % (ref 0–1.5)
BILIRUB SERPL-MCNC: 0.4 MG/DL (ref 0–1.2)
BUN SERPL-MCNC: 13 MG/DL (ref 6–20)
BUN/CREAT SERPL: 12.1 (ref 7–25)
CALCIUM SPEC-SCNC: 9.5 MG/DL (ref 8.6–10.5)
CHLORIDE SERPL-SCNC: 103 MMOL/L (ref 98–107)
CO2 SERPL-SCNC: 26.7 MMOL/L (ref 22–29)
CREAT SERPL-MCNC: 1.07 MG/DL (ref 0.76–1.27)
DEPRECATED RDW RBC AUTO: 40.3 FL (ref 37–54)
EOSINOPHIL # BLD AUTO: 0.03 10*3/MM3 (ref 0–0.4)
EOSINOPHIL NFR BLD AUTO: 0.5 % (ref 0.3–6.2)
ERYTHROCYTE [DISTWIDTH] IN BLOOD BY AUTOMATED COUNT: 12.6 % (ref 12.3–15.4)
GFR SERPL CREATININE-BSD FRML MDRD: 79 ML/MIN/1.73
GLOBULIN UR ELPH-MCNC: 2.4 GM/DL
GLUCOSE SERPL-MCNC: 96 MG/DL (ref 65–99)
HCT VFR BLD AUTO: 37.8 % (ref 37.5–51)
HGB BLD-MCNC: 13.3 G/DL (ref 13–17.7)
IMM GRANULOCYTES # BLD AUTO: 0.02 10*3/MM3 (ref 0–0.05)
IMM GRANULOCYTES NFR BLD AUTO: 0.3 % (ref 0–0.5)
LYMPHOCYTES # BLD AUTO: 2.17 10*3/MM3 (ref 0.7–3.1)
LYMPHOCYTES NFR BLD AUTO: 35.7 % (ref 19.6–45.3)
MCH RBC QN AUTO: 30.9 PG (ref 26.6–33)
MCHC RBC AUTO-ENTMCNC: 35.2 G/DL (ref 31.5–35.7)
MCV RBC AUTO: 87.9 FL (ref 79–97)
MONOCYTES # BLD AUTO: 0.41 10*3/MM3 (ref 0.1–0.9)
MONOCYTES NFR BLD AUTO: 6.8 % (ref 5–12)
NEUTROPHILS NFR BLD AUTO: 3.39 10*3/MM3 (ref 1.7–7)
NEUTROPHILS NFR BLD AUTO: 55.9 % (ref 42.7–76)
NRBC BLD AUTO-RTO: 0 /100 WBC (ref 0–0.2)
PLATELET # BLD AUTO: 324 10*3/MM3 (ref 140–450)
PMV BLD AUTO: 10.2 FL (ref 6–12)
POTASSIUM SERPL-SCNC: 4.6 MMOL/L (ref 3.5–5.2)
PROT SERPL-MCNC: 7.1 G/DL (ref 6–8.5)
RBC # BLD AUTO: 4.3 10*6/MM3 (ref 4.14–5.8)
SODIUM SERPL-SCNC: 141 MMOL/L (ref 136–145)
WBC # BLD AUTO: 6.07 10*3/MM3 (ref 3.4–10.8)

## 2020-07-13 PROCEDURE — 99213 OFFICE O/P EST LOW 20 MIN: CPT | Performed by: FAMILY MEDICINE

## 2020-07-13 PROCEDURE — 85025 COMPLETE CBC W/AUTO DIFF WBC: CPT | Performed by: FAMILY MEDICINE

## 2020-07-13 PROCEDURE — 36415 COLL VENOUS BLD VENIPUNCTURE: CPT | Performed by: FAMILY MEDICINE

## 2020-07-13 PROCEDURE — 80053 COMPREHEN METABOLIC PANEL: CPT | Performed by: FAMILY MEDICINE

## 2020-07-13 RX ORDER — DEXTROAMPHETAMINE SACCHARATE, AMPHETAMINE ASPARTATE, DEXTROAMPHETAMINE SULFATE AND AMPHETAMINE SULFATE 2.5; 2.5; 2.5; 2.5 MG/1; MG/1; MG/1; MG/1
10 TABLET ORAL 3 TIMES DAILY
Qty: 90 TABLET | Refills: 0 | Status: SHIPPED | OUTPATIENT
Start: 2020-07-13 | End: 2020-08-12

## 2020-07-13 NOTE — PROGRESS NOTES
"Allison Carpenter is a 34 y.o. male.     Seizures    This is a chronic (none rescently) problem. The current episode started more than 1 week ago. The problem has not changed since onset.     compliance with medication regimen  often fidgets with hands or feet or squirms in seat -no , is often \"on the go\" or often acts as if \"driven by a motor\" -no  and often talks excessively - no     often has difficulty awaiting turn - no  and often interrupts or intrudes on others - no      The following portions of the patient's history were reviewed and updated as appropriate: allergies, current medications, past family history, past medical history, past social history, past surgical history and problem list.    Review of Systems   Cardiovascular: Negative for palpitations.   Neurological: Positive for seizures.   Psychiatric/Behavioral: Positive for sleep disturbance.       Objective   Physical Exam   Skin: Skin is warm and dry.   Psychiatric: He has a normal mood and affect. His behavior is normal. Judgment and thought content normal.       Assessment/Plan   Problems Addressed this Visit        Other    ADHD, predominantly inattentive type    Relevant Medications    amphetamine-dextroamphetamine (Adderall) 10 MG tablet      Other Visit Diagnoses     Seizure disorder (CMS/[facility])    -  Primary    Relevant Orders    Comprehensive Metabolic Panel    CBC & Differential          Unable to complete visit using a video connection to the patient. A phone visit was used to complete this visits. Total time of discussion was 10 minutes.       You have chosen to receive care through a telephone visit. Do you consent to use a telephone visit for your medical care today? Yes      There were no vitals taken for this visit.    There is no height or weight on file to calculate BMI.            This document has been electronically signed by Govind Pizarro MD on July 13, 2020 10:07    "

## 2020-07-22 RX ORDER — LAMOTRIGINE 200 MG/1
TABLET ORAL
Qty: 60 TABLET | Refills: 3 | Status: SHIPPED | OUTPATIENT
Start: 2020-07-22 | End: 2020-12-22 | Stop reason: SDUPTHER

## 2020-09-14 ENCOUNTER — TELEPHONE (OUTPATIENT)
Dept: FAMILY MEDICINE CLINIC | Facility: CLINIC | Age: 35
End: 2020-09-14

## 2020-09-14 DIAGNOSIS — F90.0 ADHD, PREDOMINANTLY INATTENTIVE TYPE: Primary | ICD-10-CM

## 2020-09-14 RX ORDER — DEXTROAMPHETAMINE SACCHARATE, AMPHETAMINE ASPARTATE, DEXTROAMPHETAMINE SULFATE AND AMPHETAMINE SULFATE 2.5; 2.5; 2.5; 2.5 MG/1; MG/1; MG/1; MG/1
10 TABLET ORAL 3 TIMES DAILY
Qty: 90 TABLET | Refills: 0 | Status: SHIPPED | OUTPATIENT
Start: 2020-09-14 | End: 2020-11-30 | Stop reason: SDUPTHER

## 2020-10-08 ENCOUNTER — TELEMEDICINE (OUTPATIENT)
Dept: FAMILY MEDICINE CLINIC | Facility: CLINIC | Age: 35
End: 2020-10-08

## 2020-10-08 DIAGNOSIS — G40.909 SEIZURE DISORDER (HCC): ICD-10-CM

## 2020-10-08 DIAGNOSIS — F90.0 ADHD, PREDOMINANTLY INATTENTIVE TYPE: Primary | ICD-10-CM

## 2020-10-08 DIAGNOSIS — S93.401A SPRAIN OF RIGHT ANKLE, UNSPECIFIED LIGAMENT, INITIAL ENCOUNTER: ICD-10-CM

## 2020-10-08 PROCEDURE — 99213 OFFICE O/P EST LOW 20 MIN: CPT | Performed by: FAMILY MEDICINE

## 2020-10-08 RX ORDER — DEXTROAMPHETAMINE SACCHARATE, AMPHETAMINE ASPARTATE, DEXTROAMPHETAMINE SULFATE AND AMPHETAMINE SULFATE 2.5; 2.5; 2.5; 2.5 MG/1; MG/1; MG/1; MG/1
10 TABLET ORAL 3 TIMES DAILY
Qty: 30 TABLET | Refills: 0 | Status: SHIPPED | OUTPATIENT
Start: 2020-11-05 | End: 2020-11-23

## 2020-10-08 RX ORDER — DEXTROAMPHETAMINE SACCHARATE, AMPHETAMINE ASPARTATE, DEXTROAMPHETAMINE SULFATE AND AMPHETAMINE SULFATE 2.5; 2.5; 2.5; 2.5 MG/1; MG/1; MG/1; MG/1
10 TABLET ORAL 3 TIMES DAILY
Qty: 30 TABLET | Refills: 0 | Status: SHIPPED | OUTPATIENT
Start: 2020-12-03 | End: 2020-11-23

## 2020-10-08 RX ORDER — DEXTROAMPHETAMINE SACCHARATE, AMPHETAMINE ASPARTATE, DEXTROAMPHETAMINE SULFATE AND AMPHETAMINE SULFATE 2.5; 2.5; 2.5; 2.5 MG/1; MG/1; MG/1; MG/1
10 TABLET ORAL 3 TIMES DAILY
Qty: 90 TABLET | Refills: 0 | Status: SHIPPED | OUTPATIENT
Start: 2020-10-08 | End: 2020-11-23 | Stop reason: SDUPTHER

## 2020-10-08 NOTE — PROGRESS NOTES
"Allison Carpenter is a 35 y.o. male.     Seizures   This is a chronic (none rescently) problem. The current episode started more than 1 week ago. The problem has not changed since onset.  Ankle Injury   The incident occurred more than 1 week ago. The incident occurred at home. The injury mechanism was a twisting injury. The pain is present in the right ankle. The pain is mild. The pain has been fluctuating since onset. The symptoms are aggravated by movement. He has tried ice and NSAIDs for the symptoms. The treatment provided mild relief.      compliance with medication regimen  often fidgets with hands or feet or squirms in seat -no , is often \"on the go\" or often acts as if \"driven by a motor\" -no  and often talks excessively - no     often has difficulty awaiting turn - no  and often interrupts or intrudes on others - no      The following portions of the patient's history were reviewed and updated as appropriate: allergies, current medications, past family history, past medical history, past social history, past surgical history and problem list.    Review of Systems   Cardiovascular: Negative for palpitations.   Musculoskeletal: Positive for arthralgias.   Neurological: Positive for seizures.   Psychiatric/Behavioral: Positive for sleep disturbance.       Objective   Physical Exam   Neurological: He is alert.   Psychiatric: His behavior is normal. Judgment and thought content normal.       Assessment/Plan   Problems Addressed this Visit        Other    ADHD, predominantly inattentive type - Primary    Relevant Medications    amphetamine-dextroamphetamine (Adderall) 10 MG tablet    amphetamine-dextroamphetamine (Adderall) 10 MG tablet (Start on 11/5/2020)    amphetamine-dextroamphetamine (Adderall) 10 MG tablet (Start on 12/3/2020)      Other Visit Diagnoses     Seizure disorder (CMS/formerly [facility])        Sprain of right ankle, unspecified ligament, initial encounter          Diagnoses       Codes " Comments    ADHD, predominantly inattentive type    -  Primary ICD-10-CM: F90.0  ICD-9-CM: 314.00     Seizure disorder (CMS/[facility])     ICD-10-CM: G40.909  ICD-9-CM: 345.90     Sprain of right ankle, unspecified ligament, initial encounter     ICD-10-CM: S93.401A  ICD-9-CM: 845.00         I attest that all information to the H&P of review of systems is accurate.  He sprained his ankle about a month ago.  He has been using ice elevation nonsteroidals and a brace.  He continues have a little pain twinge every once in a while and little swelling plain and ecchymosis.  He continues to improve.  We discussed possibility of getting an x-ray but will hold off and just observe at this time.  Otherwise continue his seizure medicine and ADHD medicine.  He takes Adderall 10 mg 3 times daily as to twice daily was not working well for him.  Unable to complete visit using a video connection to the patient. A phone visit was used to complete this visits. Total time of discussion was 15 minutes.       You have chosen to receive care through a telephone visit. Do you consent to use a telephone visit for your medical care today? Yes      There were no vitals taken for this visit.    There is no height or weight on file to calculate BMI.            This document has been electronically signed by Govind Pizarro MD on October 8, 2020 15:20 CDT

## 2020-11-23 ENCOUNTER — TELEPHONE (OUTPATIENT)
Dept: FAMILY MEDICINE CLINIC | Facility: CLINIC | Age: 35
End: 2020-11-23

## 2020-11-23 DIAGNOSIS — F90.0 ADHD, PREDOMINANTLY INATTENTIVE TYPE: ICD-10-CM

## 2020-11-23 RX ORDER — DEXTROAMPHETAMINE SACCHARATE, AMPHETAMINE ASPARTATE, DEXTROAMPHETAMINE SULFATE AND AMPHETAMINE SULFATE 2.5; 2.5; 2.5; 2.5 MG/1; MG/1; MG/1; MG/1
10 TABLET ORAL 3 TIMES DAILY
Qty: 90 TABLET | Refills: 0 | Status: SHIPPED | OUTPATIENT
Start: 2020-11-23 | End: 2020-11-30 | Stop reason: SDUPTHER

## 2020-11-23 NOTE — TELEPHONE ENCOUNTER
PT called stating he picked up his adderall yesterday from pharmacy and it only had 30 tablets in it but he is suppoesed to take 3 tablets a day and should have a month's worth of medicine. He would like a call back [phone]

## 2020-11-27 PROCEDURE — U0003 INFECTIOUS AGENT DETECTION BY NUCLEIC ACID (DNA OR RNA); SEVERE ACUTE RESPIRATORY SYNDROME CORONAVIRUS 2 (SARS-COV-2) (CORONAVIRUS DISEASE [COVID-19]), AMPLIFIED PROBE TECHNIQUE, MAKING USE OF HIGH THROUGHPUT TECHNOLOGIES AS DESCRIBED BY CMS-2020-01-R: HCPCS | Performed by: FAMILY MEDICINE

## 2020-11-30 ENCOUNTER — TELEPHONE (OUTPATIENT)
Dept: FAMILY MEDICINE CLINIC | Facility: CLINIC | Age: 35
End: 2020-11-30

## 2020-11-30 DIAGNOSIS — F90.0 ADHD, PREDOMINANTLY INATTENTIVE TYPE: ICD-10-CM

## 2020-11-30 RX ORDER — DEXTROAMPHETAMINE SACCHARATE, AMPHETAMINE ASPARTATE, DEXTROAMPHETAMINE SULFATE AND AMPHETAMINE SULFATE 2.5; 2.5; 2.5; 2.5 MG/1; MG/1; MG/1; MG/1
10 TABLET ORAL 3 TIMES DAILY
Qty: 90 TABLET | Refills: 0 | Status: SHIPPED | OUTPATIENT
Start: 2020-11-30 | End: 2020-12-22 | Stop reason: SDUPTHER

## 2020-11-30 RX ORDER — DEXTROAMPHETAMINE SACCHARATE, AMPHETAMINE ASPARTATE, DEXTROAMPHETAMINE SULFATE AND AMPHETAMINE SULFATE 2.5; 2.5; 2.5; 2.5 MG/1; MG/1; MG/1; MG/1
10 TABLET ORAL 3 TIMES DAILY
Qty: 90 TABLET | Refills: 0 | Status: CANCELLED | OUTPATIENT
Start: 2020-11-30 | End: 2021-11-30

## 2020-11-30 RX ORDER — DEXTROAMPHETAMINE SACCHARATE, AMPHETAMINE ASPARTATE, DEXTROAMPHETAMINE SULFATE AND AMPHETAMINE SULFATE 2.5; 2.5; 2.5; 2.5 MG/1; MG/1; MG/1; MG/1
10 TABLET ORAL 3 TIMES DAILY
Qty: 90 TABLET | Refills: 0 | Status: CANCELLED | OUTPATIENT
Start: 2020-11-30

## 2020-11-30 NOTE — TELEPHONE ENCOUNTER
Caller: Hammad Carpenter Carpio    Relationship: [facility]    Best call back number: [phone]   Medication needed:   Requested Prescriptions     Pending Prescriptions Disp Refills   • amphetamine-dextroamphetamine (Adderall) 10 MG tablet 90 tablet 0     Sig: Take 1 tablet by mouth 3 (Three) Times a Day.       When do you need the refill by:  11/30/20    What details did the patient provide when requesting the medication: PATIENT STATED THAT HE ONLY GOT A 30 DAY SUPPLY AND THE PRESCRIPTION WAS SUPPOSE TO BE FOR 90 DAY.    Does the patient have less than a 3 day supply:  [] Yes  [x] No    What is the patient's preferred pharmacy: [facility] [address] AT [facility] [place] [address] - [phone]  - [phone] FX

## 2020-11-30 NOTE — TELEPHONE ENCOUNTER
Refill sent 11/23/2020.     Dr. Pizarro,    Is he taking Adderall 10 mg TID?   The script states take 1 TID for 90 days,     However on 11/23/2020 the Pharmacy only filled the script for 10 days or #30 tablets.    He will be out tomorrow so this needs to be taken care of ASAP.     The Script needs to read Take 1 tablet TID.   Mr. Carpenter has called back

## 2020-12-22 ENCOUNTER — OFFICE VISIT (OUTPATIENT)
Dept: FAMILY MEDICINE CLINIC | Facility: CLINIC | Age: 35
End: 2020-12-22

## 2020-12-22 VITALS
SYSTOLIC BLOOD PRESSURE: 122 MMHG | HEIGHT: 71 IN | BODY MASS INDEX: 24.64 KG/M2 | WEIGHT: 176 LBS | DIASTOLIC BLOOD PRESSURE: 70 MMHG

## 2020-12-22 DIAGNOSIS — M75.41 IMPINGEMENT SYNDROME OF SHOULDER, RIGHT: ICD-10-CM

## 2020-12-22 DIAGNOSIS — F90.0 ADHD, PREDOMINANTLY INATTENTIVE TYPE: Chronic | ICD-10-CM

## 2020-12-22 DIAGNOSIS — G40.909 NONINTRACTABLE EPILEPSY WITHOUT STATUS EPILEPTICUS, UNSPECIFIED EPILEPSY TYPE (HCC): Primary | Chronic | ICD-10-CM

## 2020-12-22 PROBLEM — R41.840 CONCENTRATION DEFICIT: Status: RESOLVED | Noted: 2017-12-08 | Resolved: 2020-12-22

## 2020-12-22 PROCEDURE — 99214 OFFICE O/P EST MOD 30 MIN: CPT | Performed by: FAMILY MEDICINE

## 2020-12-22 RX ORDER — DEXTROAMPHETAMINE SACCHARATE, AMPHETAMINE ASPARTATE, DEXTROAMPHETAMINE SULFATE AND AMPHETAMINE SULFATE 2.5; 2.5; 2.5; 2.5 MG/1; MG/1; MG/1; MG/1
10 TABLET ORAL 3 TIMES DAILY
Qty: 84 TABLET | Refills: 0 | Status: SHIPPED | OUTPATIENT
Start: 2020-12-22 | End: 2021-01-21

## 2020-12-22 RX ORDER — LAMOTRIGINE 200 MG/1
200 TABLET ORAL 2 TIMES DAILY
Qty: 180 TABLET | Refills: 1 | Status: SHIPPED | OUTPATIENT
Start: 2020-12-22 | End: 2021-06-24 | Stop reason: SDUPTHER

## 2020-12-22 RX ORDER — LEVETIRACETAM 750 MG/1
TABLET ORAL
Qty: 180 TABLET | Refills: 1 | Status: SHIPPED | OUTPATIENT
Start: 2020-12-22 | End: 2021-06-24 | Stop reason: SDUPTHER

## 2020-12-22 NOTE — PROGRESS NOTES
Subjective   Hammad Carpenter is a 35 y.o. male.  Follow-up Dr. Pizarro's office diagnosis of what sounds like generalized epilepsy with adult adult attention deficit disorder concentration type.  Patient been on current medicines for some time.  Has not seen a neurologist in 2 years however.  Needs to at least have a yearly evaluation especially for driving issues.  Has developed a right impingement syndrome of the shoulder from repetitive overuse and old injury.  History noted.  Declines flu vaccine.    History of Present Illness   HPI    The following portions of the patient's history were reviewed and updated as appropriate: allergies, current medications, past family history, past medical history, past social history, past surgical history and problem list.    Review of Systems  Review of Systems   Constitutional: Negative for activity change, appetite change, fatigue and unexpected weight change.   HENT: Negative for trouble swallowing and voice change.    Eyes: Negative for redness and visual disturbance.   Respiratory: Negative for cough and wheezing.    Cardiovascular: Negative for chest pain and palpitations.   Gastrointestinal: Negative for abdominal pain, constipation, diarrhea, nausea and vomiting.   Genitourinary: Negative for urgency.   Musculoskeletal: Positive for arthralgias. Negative for joint swelling.   Neurological: Negative for syncope and headaches.   Hematological: Negative for adenopathy.   Psychiatric/Behavioral: Positive for decreased concentration (Better on medication). Negative for sleep disturbance.       Objective   Physical Exam  Physical Exam  Constitutional:       Appearance: He is well-developed.   HENT:      Head: Normocephalic.   Eyes:      Pupils: Pupils are equal, round, and reactive to light.   Neck:      Musculoskeletal: Normal range of motion.      Thyroid: No thyromegaly.   Cardiovascular:      Rate and Rhythm: Normal rate.      Pulses: Normal pulses.      Heart  "sounds: No murmur. No friction rub. No gallop.    Pulmonary:      Effort: Pulmonary effort is normal.   Abdominal:      General: There is no distension.      Palpations: Abdomen is soft. There is no mass.      Tenderness: There is no abdominal tenderness.   Musculoskeletal:      Right shoulder: He exhibits decreased range of motion, tenderness and bony tenderness.      Comments: Right shoulder tender AC joint mild stiffness past about 110 120 degrees mild locking at 160 140.  Negative pronator negative  left side clear   Skin:     General: Skin is warm and dry.   Neurological:      Mental Status: He is alert and oriented to person, place, and time.      Deep Tendon Reflexes: Reflexes are normal and symmetric.   Psychiatric:         Mood and Affect: Mood normal. Affect is blunt.         Speech: Speech is delayed.         Behavior: Behavior normal.         Cognition and Memory: Cognition normal.           Visit Vitals  /70   Ht 180.3 cm (71\")   Wt 79.8 kg (176 lb)   BMI 24.55 kg/m²     Body mass index is 24.55 kg/m².      Assessment/Plan   Diagnoses and all orders for this visit:    1. Nonintractable epilepsy without status epilepticus, unspecified epilepsy type (CMS/HCC) (Primary)  -     Ambulatory Referral to Neurology  -     levETIRAcetam (KEPPRA) 750 MG tablet; TAKE ONE TABLET BY MOUTH TWICE A DAY  Dispense: 180 tablet; Refill: 1  -     lamoTRIgine (LaMICtal) 200 MG tablet; Take 1 tablet by mouth 2 (Two) Times a Day.  Dispense: 180 tablet; Refill: 1    2. ADHD, predominantly inattentive type  -     amphetamine-dextroamphetamine (Adderall) 10 MG tablet; Take 1 tablet by mouth 3 (Three) Times a Day for 30 days.  Dispense: 84 tablet; Refill: 0    3. Impingement syndrome of shoulder, right  -     Ambulatory Referral to Physical Therapy Evaluate and treat    Ordered therapy for the shoulder referral to neurology follow-up for other continue medicines for now will put the Adderall on a 28-day cycle starting " January once with to be no weekend refills continue contract recheck 3-month

## 2020-12-23 ENCOUNTER — TELEPHONE (OUTPATIENT)
Dept: FAMILY MEDICINE CLINIC | Facility: CLINIC | Age: 35
End: 2020-12-23

## 2020-12-23 NOTE — TELEPHONE ENCOUNTER
Chris from [facility] states patient was referred, but he has been seen by another neurologist before. They are needing those records sent.     Callback: [phone]

## 2020-12-30 ENCOUNTER — TELEPHONE (OUTPATIENT)
Dept: FAMILY MEDICINE CLINIC | Facility: CLINIC | Age: 35
End: 2020-12-30

## 2020-12-30 NOTE — TELEPHONE ENCOUNTER
PATIENT CALLED STATING THAT HE IS CONCERNED WITH REFILLING HIS ADDERALL AND HE WOULD LIKE TO SPEAK WITH DR GOMEZ OR HIS NURSE. HE HAS HAD ISSUES FILLING THIS MEDICATIONS SEVERAL TIMES, EITHER ITS THE WRONG DOSAGE OR THEY ARE JUST NOT ABLE TO FILL THE ORDER.    GOOD CALL BACK:  [phone]

## 2021-01-08 ENCOUNTER — HOSPITAL ENCOUNTER (OUTPATIENT)
Dept: PHYSICAL THERAPY | Facility: HOSPITAL | Age: 36
Setting detail: THERAPIES SERIES
Discharge: HOME | End: 2021-01-08

## 2021-01-08 DIAGNOSIS — M75.41 IMPINGEMENT SYNDROME OF SHOULDER, RIGHT: Primary | ICD-10-CM

## 2021-01-08 PROCEDURE — 97161 PT EVAL LOW COMPLEX 20 MIN: CPT | Performed by: PHYSICAL THERAPIST

## 2021-01-08 NOTE — THERAPY EVALUATION
"    Outpatient Physical Therapy Ortho Initial Evaluation  [facility]     Patient Name: Hammad Carpenter  : 1985  MRN: [id number]  Today's Date: 2021      Visit Date: 2021  Attendance:    Subjective Improvement: n/a  Next MD Appt: 3/26/21  Recert Date: 21    Therapy Diagnosis: R shoulder dysfunction    Patient Active Problem List   Diagnosis   • ADHD, predominantly inattentive type   • Epilepsy (CMS/HCC)             Past Surgical History:   Procedure Laterality Date   • INJECTION OF MEDICATION  2013    Kenalog       Visit Dx:     ICD-10-CM ICD-9-CM   1. Impingement syndrome of shoulder, right  M75.41 726.2         Patient History     Row Name 21 1320 21 1308          History    Chief Complaint  Difficulty with daily activities;Joint stiffness;Muscle weakness;Pain  -SS  Difficulty with daily activities;Joint stiffness;Muscle weakness;Pain  (Pended)   (Patient-Rptd)   -patient     Type of Pain  Shoulder pain right  -SS  Shoulder pain  (Pended)   (Patient-Rptd)   -patient     Date Current Problem(s) Began  18  -SS  18  (Pended)   (Patient-Rptd)   -patient     Brief Description of Current Complaint  Patient reports a chronic history of right shoulder pain. History of a \" shoulder that didn't heal right\" in . Pain seems to be more inside the joint. Occasional sharp pain when bringing his arm down. He is able to change how he moves the arm and decrease the pain. Pain when pulling his seatbelt. \"Generally okay\" with lifting when he elbows close to his body. Weight in his arm tends to bother him more than unweighted movement.  male with children.   -SS  Swimmer's shoulder  (Pended)   (Patient-Rptd)   -patient     Patient/Caregiver Goals  --  Relieve pain;Return to prior level of function;Improve mobility;Improve strength;Know what to do to help the symptoms  (Pended)   (Patient-Rptd)   -patient     Current Tobacco Use  no  -SS  --     " "Smoking Status  never  -SS  --     Patient's Rating of General Health  Very good  -SS  --     Hand Dominance  left-handed  -SS  left-handed  (Pended)   (Patient-Rptd)   -patient     Occupation/sports/leisure activities  [employer] - maintenance. Hobbies: woodworking, computers, small electronics  -SS  --     What clinical tests have you had for this problem?  --  Other 2 (comment)  (Pended)   (Patient-Rptd)   -patient     Other Clinical Tests  none recent  -SS  Seen by gen prac.  (Pended)   (Patient-Rptd)   -patient        Pain     Pain Location  Shoulder right  -SS  --     Pain at Present  1  -SS  --     Pain at Best  0 over past 1 month  -SS  --     Pain at Worst  7 over past 1 month  -SS  --     Pain Frequency  Intermittent  -SS  --     Pain Description  Sharp \"guitar string\"  -SS  --     What Performance Factors Make the Current Problem(s) WORSE?  see above  -SS  --     What Performance Factors Make the Current Problem(s) BETTER?  keep arm down with hand resting on abdomen  -SS  --     Is your sleep disturbed?  Yes  -SS  --     Is medication used to assist with sleep?  No  -SS  --     Difficulties at work?  pain  -SS  --     Difficulties with ADL's?  intermittent pain  -SS  --     Difficulties with recreational activities?  intermittent pain  -SS  --        Fall Risk Assessment    Any falls in the past year:  No  -SS  No  (Pended)   (Patient-Rptd)   -patient     Does patient have a fear of falling  No  -SS  --        Services    Prior Rehab/Home Health Experiences  --  No  (Pended)   (Patient-Rptd)   -patient     Are you currently receiving Home Health services  --  No  (Pended)   (Patient-Rptd)   -patient     Do you plan to receive Home Health services in the near future  --  No  (Pended)   (Patient-Rptd)   -patient        Daily Activities    Primary Language  English  -SS  English  (Pended)   (Patient-Rptd)   -patient     How does patient learn best?  --  Demonstration  (Pended)   (Patient-Rptd)   -patient        " "Safety    Are you being hurt, hit, or frightened by anyone at home or in your life?  --  No  (Pended)   (Patient-Rptd)   -patient     Are you being neglected by a caregiver  --  No  (Pended)   (Patient-Rptd)   -patient     Have you had any of the following issues with  --  N/A  (Pended)   (Patient-Rptd)   -patient       User Key  (r) = Recorded By, (t) = Taken By, (c) = Cosigned By    Initials Name Provider Type    SS Dayne Dunham, PT DPT Physical Therapist    patient Hammad Carpenter --          PT Ortho     Row Name 01/08/21 1300       Subjective Comments    Subjective Comments  see Therapy Patient History  -SS       Precautions and Contraindications    Precautions/Limitations  no known precautions/limitations  -SS       Subjective Pain    Able to rate subjective pain?  yes  -SS    Pre-Treatment Pain Level  1  -SS    Post-Treatment Pain Level  2  -SS       Posture/Observations    Posture/Observations Comments  Rounded shoulder posture. Prominent R AC joint.  -SS       Shoulder Impingement/Rotator Cuff Special Tests    Oliver-Gerald Test (RC Lesion vs. Bursitis)  Right:;Negative  -SS    Neer Impingement Test (RC Lesion vs. Bursitis)  Right: test negative but \"snag\" while lowering  -SS    Full Can Test (RC Lesion)  Right:;Negative  -SS    Empty Can Test (RC Lesion)  Right:;Positive  -SS    Drop Arm Test (Full Thickness RC Lesion)  Right:;Negative  -SS    Lift-Off Test (Subscapularis Lesion)  Right:;Negative  -SS    Belly Press (Subscapularis Lesion)  Right:;Negative  -SS    Hornblower Test (Teres Minor Lesion)  Right:;Negative  -SS    Speed's Test (LH of Biceps Lesion)  Right:;Negative  -SS    Yergason Test (LH of Biceps Lesion)  Right:;Negative  -SS       Shoulder Laxity/Instability Special Tests    Load and Shift Test  Right:;Negative  -SS    Sulcus Sign, 0 Degrees  Right:;Negative  -SS    Sulcus Sign, 90 Degrees  Right:;Negative  -SS    Posterior Apprehension Test  Right:;Negative  -SS    " "Anterior Apprehension/Relocation Test, at 90 Degrees  Right:;Negative  -SS    Anterior Apprehension/Relocation Test, at 120 Degrees  Right:;Negative  -SS       Biceps/Labral Special Tests    [place]'s Test (Labral Test)  Right:;Positive  -SS       Scapular Special Tests    Scapular Assistance Test (Scapular Dyskinesia)  Right:;Negative  -SS       Shoulder Girdle Palpation    Shoulder Girdle Palpation?  -- Non-tender to palpation about the shoulder.  -SS       Right Upper Ext    Rt Shoulder Abduction AROM  170 deg  -SS    Rt Shoulder Extension AROM  77 deg  -SS    Rt Shoulder Flexion AROM  155 deg; patient notes pop in lateral shoulder with eccentric lowering at 90 deg  -SS    Rt Shoulder External Rotation AROM  100 deg; \"felt like it was going to snag\"  -SS    Rt Shoulder Internal Rotation AROM  57 deg; \"felt like it was going to\" catch  -SS       MMT (Manual Muscle Testing)    Rt Upper Ext  Rt Shoulder Flexion;Rt Shoulder Extension;Rt Shoulder ABduction;Rt Shoulder Internal Rotation;Rt Shoulder External Rotation;Rt Scapular ABduction & Lateral Rotation;Rt Scapular ADduction & Medial Rotation;Rt Scapular ADduction & Depression;Rt Scapular ADuction  -SS       MMT Right Upper Ext    Rt Shoulder Flexion MMT, Gross Movement  (5/5) normal pain anterolateral corner when palm down  -SS    Rt Shoulder Extension MMT, Gross Movement  (5/5) normal  -SS    Rt Shoulder ABduction MMT, Gross Movement  (5/5) normal  -SS    Rt Shoulder Internal Rotation MMT, Gross Movement  (5/5) normal  -SS    Rt Shoulder External Rotation MMT, Gross Movement  (5/5) normal pain anterolateral corner  -SS    Rt Scapular ADduction MMT, Gross Movement  (5/5) normal  -SS    Rt Scapular ADduction & Depression MMT, Gross Movement  (5/5) normal  -SS    Rt Scapular ADD & Medial Rotation MMT, Gross Movement  (5/5) normal  -SS    Rt Scapular ABD & Lateral Rotation MMT, Gross Movement  (3+/5) fair plus  -SS      User Key  (r) = Recorded By, (t) = Taken By, (c) " "= Cosigned By    Initials Name Provider Type     Dayne Dunham, PT DPT Physical Therapist          Therapy Education  Education Details: findings, Ortho referral, serratus strengthening by \"plus\" motion,   How Provided: Verbal  Provided to: Patient  Level of Understanding: Verbalized     PT OP Goals     Row Name 01/08/21 1300          PT Short Term Goals    STG Date to Achieve  -- goals not set this date  -        Time Calculation    PT Goal Re-Cert Due Date  01/29/21  -       User Key  (r) = Recorded By, (t) = Taken By, (c) = Cosigned By    Initials Name Provider Type     Dayne Dunham, PT DPT Physical Therapist          PT Assessment/Plan     Row Name 01/08/21 1300          PT Assessment    Functional Limitations  Limitation in home management;Limitations in community activities;Limitations in functional capacity and performance;Performance in leisure activities;Performance in self-care ADL;Performance in work activities  -     Impairments  Pain  -     Assessment Comments  I have referred patient to Orthopedic Care for evaluation. Shoulder findings somewhat non-specific, but I have concerns related to glenoid labrum and/or glenohumeral ligaments that I think would warrant evaluation by Ortho. Patient verbalizes understanding.   -     Patient/caregiver participated in establishment of treatment plan and goals  Yes  -        PT Plan    PT Plan Comments  Hold P.T. at this time pending Ortho consult. Will establish treatment plan and goals next visit if referred back by Ortho.  -       User Key  (r) = Recorded By, (t) = Taken By, (c) = Cosigned By    Initials Name Provider Type     Dayne Dunham, PT DPT Physical Therapist             Outcome Measure Options: Quick DASH  Quick DASH  Open a tight or new jar.: No Difficulty  Do heavy household chores (e.g., wash walls, wash floors): Moderate Difficulty  Carry a shopping bag or briefcase: No Difficulty  Wash your back: Mild " Difficulty  Use a knife to cut food: No Difficulty  Recreational activities in which you take some force or impact through your arm, should or hand (e.g. golf, hammering, tennis, etc.): Moderate Difficulty  During the past week, to what extent has your arm, shoulder, or hand problem interfered with your normal social activities with family, friends, neighbors or groups?: Slightly  During the past week, were you limited in your work or other regular daily activities as a result of your arm, shoulder or hand problem?: Slightly Limited  Arm, Shoulder, or hand pain: Moderate  Tingling (pins and needles) in your arm, shoulder, or hand: None  During the past week, how much difficulty have you had sleeping because of the pain in your arm, shoulder or hand?: Mild Difficulty  Number of Questions Answered: 11  Quick DASH Score: 22.73         Time Calculation:     Start Time: 1320  Stop Time: 1410  Time Calculation (min): 50 min     Therapy Charges for Today     Code Description Service Date Service Provider Modifiers Qty    [id number] HC PT EVAL LOW COMPLEXITY 3 1/8/2021 Dayne Dunham, PT DPT GP 1                   Dyane Dunham, PT, DPT, CHT  1/8/2021

## 2021-01-12 ENCOUNTER — OFFICE VISIT (OUTPATIENT)
Dept: ORTHOPEDIC SURGERY | Facility: CLINIC | Age: 36
End: 2021-01-12

## 2021-01-12 VITALS — WEIGHT: 176 LBS | BODY MASS INDEX: 24.64 KG/M2 | HEIGHT: 71 IN

## 2021-01-12 DIAGNOSIS — M25.511 CHRONIC RIGHT SHOULDER PAIN: ICD-10-CM

## 2021-01-12 DIAGNOSIS — M25.511 RIGHT SHOULDER PAIN, UNSPECIFIED CHRONICITY: Primary | ICD-10-CM

## 2021-01-12 DIAGNOSIS — G89.29 CHRONIC RIGHT SHOULDER PAIN: ICD-10-CM

## 2021-01-12 DIAGNOSIS — S43.101A ACROMIOCLAVICULAR JOINT SEPARATION, TYPE 3, RIGHT, INITIAL ENCOUNTER: Primary | ICD-10-CM

## 2021-01-12 DIAGNOSIS — I48.20 ATRIAL FIBRILLATION, CHRONIC (HCC): ICD-10-CM

## 2021-01-12 DIAGNOSIS — M24.811 INTERNAL DERANGEMENT OF RIGHT SHOULDER: ICD-10-CM

## 2021-01-12 PROCEDURE — 99204 OFFICE O/P NEW MOD 45 MIN: CPT | Performed by: ORTHOPAEDIC SURGERY

## 2021-01-12 RX ORDER — MELOXICAM 15 MG/1
TABLET ORAL
Qty: 30 TABLET | Refills: 3 | Status: SHIPPED | OUTPATIENT
Start: 2021-01-12 | End: 2021-09-24

## 2021-01-12 NOTE — PROGRESS NOTES
Hammad Carpenter is a 35 y.o. male   Primary provider:  Mack Pritchett MD       Chief Complaint   Patient presents with   • Right Shoulder - Shoulder Pain       HISTORY OF PRESENT ILLNESS:    Right shoulder pain has been off/on for years has become more frequent in the last 3 months. xrays done today.   Patient states that in 2006 he rolled down a hill and injured his right shoulder.  He was told at the time that he had an AC joint separation.  He had significant pain for a period of time after that injury but then did very well.  He began having intermittent pain approximately 3 to 6 months ago and has slowly been worsening in intensity and induration over this period of time.  He is having some catching and sharp stabbing pains.  No numbness or tingling.  He has dull throbbing pains.      Arm Pain   Incident onset: 3 months.  There was no injury mechanism. The pain is present in the right shoulder. The quality of the pain is described as aching and stabbing. The pain is at a severity of 2/10. The pain is moderate. The pain has been intermittent since the incident. Associated symptoms comments: Clicking, popping. . The symptoms are aggravated by lifting (raising arm. ). He has tried rest for the symptoms.        CONCURRENT MEDICAL HISTORY:    Past Medical History:   Diagnosis Date   • Drug therapy     long term   • Encounter for general adult medical examination without abnormal findings    • Epilepsy (CMS/HCC)    • Fatigue    • General medical exam     other: for administrative purposes   • Heartburn    • Right inguinal hernia     s/p lap rih repair      • Screening for lipoid disorders        Allergies   Allergen Reactions   • Lortab [Hydrocodone-Acetaminophen] Nausea And Vomiting   • Amoxicillin Unknown - Low Severity     Childhood reaction         Current Outpatient Medications:   •  amphetamine-dextroamphetamine (Adderall) 10 MG tablet, Take 1 tablet by mouth 3 (Three) Times a Day for 30 days., Disp: 84  "tablet, Rfl: 0  •  lamoTRIgine (LaMICtal) 200 MG tablet, Take 1 tablet by mouth 2 (Two) Times a Day., Disp: 180 tablet, Rfl: 1  •  levETIRAcetam (KEPPRA) 750 MG tablet, TAKE ONE TABLET BY MOUTH TWICE A DAY, Disp: 180 tablet, Rfl: 1  •  meloxicam (MOBIC) 15 MG tablet, 1 PO Daily with food., Disp: 30 tablet, Rfl: 3    Past Surgical History:   Procedure Laterality Date   • INJECTION OF MEDICATION  09/13/2013    Kenalog       Family History   Problem Relation Age of Onset   • Hypertension Other    • Diabetes Other         Social History     Socioeconomic History   • Marital status:      Spouse name: Not on file   • Number of children: Not on file   • Years of education: Not on file   • Highest education level: Not on file   Tobacco Use   • Smoking status: Never Smoker   • Smokeless tobacco: Never Used   Substance and Sexual Activity   • Drug use: Defer   • Sexual activity: Defer        Review of Systems   Musculoskeletal: Positive for joint swelling.        Joint pain   All other systems reviewed and are negative.      PHYSICAL EXAMINATION:       Ht 180.3 cm (71\")   Wt 79.8 kg (176 lb)   BMI 24.55 kg/m²     Physical Exam  Constitutional:       General: He is not in acute distress.     Appearance: Normal appearance. He is well-developed. He is not ill-appearing.   Pulmonary:      Effort: Pulmonary effort is normal. No respiratory distress.   Neurological:      Mental Status: He is alert and oriented to person, place, and time.   Psychiatric:         Mood and Affect: Mood normal.         Behavior: Behavior normal.         Thought Content: Thought content normal.         Judgment: Judgment normal.         GAIT:     [x]  Normal  []  Antalgic    Assistive device: [x]  None  []  Walker     []  Crutches  []  Cane     []  Wheelchair  []  Stretcher    Right Shoulder Exam     Comments:  He has full range of motion but has a significant tenderness with Oliver Gerald test.  He has definite elevated clavicle laterally.  " Negative load-and-shift test.  He does have some pain with internal and external rotation at 90 degrees.  Pain with cross shoulder adduction.              Xr Shoulder 2+ View Right    Result Date: 1/12/2021  Narrative: Ordering Provider:  Chris Lugo MD Ordering Diagnosis/Indication:  Right shoulder pain, unspecified chronicity Procedure:  XR SHOULDER 2+ VW RIGHT Exam Date:  1/12/21 COMPARISON:  Not applicable, no relevant images available.     Impression:  3 views of the right shoulder show acceptable position and alignment of the glenohumeral joint with no evidence of acute bony abnormality.  He has a chronic stage III AC joint separation with some bony changes at the distal aspect of the clavicle.  There appears to be narrowing of the subacromial space but no prior x-rays are available for comparison.  There is some increased calcification on the superior aspect of the greater tuberosity which may indicate some impingement.  No acute findings. Chris Lugo MD 1/12/21           ASSESSMENT:    Diagnoses and all orders for this visit:    Acromioclavicular joint separation, type 3, right, initial encounter  -     MRI Shoulder Right Arthrogram; Future  -     FL Contrast Injection CT / MRI; Future    Chronic right shoulder pain  -     MRI Shoulder Right Arthrogram; Future  -     FL Contrast Injection CT / MRI; Future    Internal derangement of right shoulder  -     MRI Shoulder Right Arthrogram; Future  -     FL Contrast Injection CT / MRI; Future    Atrial fibrillation, chronic (CMS/HCC)    Other orders  -     meloxicam (MOBIC) 15 MG tablet; 1 PO Daily with food.          PLAN    Patient has a chronic grade 3 AC joint separation.  However, he also has symptoms with clicking and catching that could be consistent with labral tearing or internal derangement.  We discussed proceeding with MRI including arthrogram to better highlight the intra-articular findings.    He will continue slowly progressing  strength and conditioning exercises.    We also discussed using meloxicam for nonsteroidal anti-inflammatory usage.    Follow-up after the MRI.    Return for recheck for MRI results.    Chris Lugo MD

## 2021-01-22 ENCOUNTER — HOSPITAL ENCOUNTER (OUTPATIENT)
Dept: GENERAL RADIOLOGY | Facility: HOSPITAL | Age: 36
Discharge: HOME | End: 2021-01-22

## 2021-01-22 ENCOUNTER — HOSPITAL ENCOUNTER (OUTPATIENT)
Dept: MRI IMAGING | Facility: HOSPITAL | Age: 36
Discharge: HOME | End: 2021-01-22

## 2021-01-22 VITALS
TEMPERATURE: 98.6 F | HEART RATE: 76 BPM | DIASTOLIC BLOOD PRESSURE: 81 MMHG | OXYGEN SATURATION: 100 % | RESPIRATION RATE: 18 BRPM | SYSTOLIC BLOOD PRESSURE: 139 MMHG

## 2021-01-22 DIAGNOSIS — M25.511 CHRONIC RIGHT SHOULDER PAIN: ICD-10-CM

## 2021-01-22 DIAGNOSIS — G89.29 CHRONIC RIGHT SHOULDER PAIN: ICD-10-CM

## 2021-01-22 DIAGNOSIS — S43.101A ACROMIOCLAVICULAR JOINT SEPARATION, TYPE 3, RIGHT, INITIAL ENCOUNTER: ICD-10-CM

## 2021-01-22 DIAGNOSIS — M24.811 INTERNAL DERANGEMENT OF RIGHT SHOULDER: ICD-10-CM

## 2021-01-22 PROCEDURE — 25010000002 GADOTERIDOL PER 1 ML: Performed by: ORTHOPAEDIC SURGERY

## 2021-01-22 PROCEDURE — 77002 NEEDLE LOCALIZATION BY XRAY: CPT

## 2021-01-22 PROCEDURE — A9579 GAD-BASE MR CONTRAST NOS,1ML: HCPCS | Performed by: ORTHOPAEDIC SURGERY

## 2021-01-22 PROCEDURE — 25010000002 IOPAMIDOL 61 % SOLUTION: Performed by: ORTHOPAEDIC SURGERY

## 2021-01-22 PROCEDURE — 73222 MRI JOINT UPR EXTREM W/DYE: CPT

## 2021-01-22 RX ADMIN — GADOTERIDOL 0.5 ML: 279.3 INJECTION, SOLUTION INTRAVENOUS at 14:24

## 2021-01-22 RX ADMIN — IOPAMIDOL 5 ML: 612 INJECTION, SOLUTION INTRAVENOUS at 14:19

## 2021-01-22 NOTE — PRE-PROCEDURE NOTE
Patient for right shoulder arthrogram.Procedure, risks , and benefits discussed with patient and patient  gave informed consent.  H&P dated 1/12/2021 reviewed with no changes.

## 2021-01-22 NOTE — POST-PROCEDURE NOTE
Pre-Op Diagnosis:  Right shoulder pain  Post-Op Diagnosis: Right shoulder pain  Procedure: Right shoulder arthrogram pre-MR  Anesthesia: local  EBL: none  Specimens:   None sent to LAB / PATH  Findings: No Specimen sent, awaiting results  Complications: None  Disposition:  Patient tolerated the procedure well

## 2021-01-29 ENCOUNTER — OFFICE VISIT (OUTPATIENT)
Dept: ORTHOPEDIC SURGERY | Facility: CLINIC | Age: 36
End: 2021-01-29

## 2021-01-29 VITALS — BODY MASS INDEX: 25.2 KG/M2 | WEIGHT: 180 LBS | HEIGHT: 71 IN

## 2021-01-29 DIAGNOSIS — S43.101D ACROMIOCLAVICULAR JOINT SEPARATION, TYPE 3, RIGHT, SUBSEQUENT ENCOUNTER: Primary | ICD-10-CM

## 2021-01-29 DIAGNOSIS — M25.511 RIGHT SHOULDER PAIN, UNSPECIFIED CHRONICITY: ICD-10-CM

## 2021-01-29 DIAGNOSIS — M75.111 PARTIAL NONTRAUMATIC TEAR OF ROTATOR CUFF, RIGHT: ICD-10-CM

## 2021-01-29 DIAGNOSIS — G89.29 CHRONIC RIGHT SHOULDER PAIN: ICD-10-CM

## 2021-01-29 DIAGNOSIS — M25.511 CHRONIC RIGHT SHOULDER PAIN: ICD-10-CM

## 2021-01-29 PROCEDURE — 20610 DRAIN/INJ JOINT/BURSA W/O US: CPT | Performed by: ORTHOPAEDIC SURGERY

## 2021-01-29 PROCEDURE — 99213 OFFICE O/P EST LOW 20 MIN: CPT | Performed by: ORTHOPAEDIC SURGERY

## 2021-01-29 RX ORDER — DEXTROAMPHETAMINE SACCHARATE, AMPHETAMINE ASPARTATE, DEXTROAMPHETAMINE SULFATE AND AMPHETAMINE SULFATE 2.5; 2.5; 2.5; 2.5 MG/1; MG/1; MG/1; MG/1
10 TABLET ORAL 3 TIMES DAILY
COMMUNITY
End: 2021-03-01 | Stop reason: SDUPTHER

## 2021-01-29 RX ADMIN — TRIAMCINOLONE ACETONIDE 40 MG: 40 INJECTION, SUSPENSION INTRA-ARTICULAR; INTRAMUSCULAR at 10:44

## 2021-01-29 RX ADMIN — LIDOCAINE HYDROCHLORIDE 2 ML: 10 INJECTION, SOLUTION INFILTRATION; PERINEURAL at 10:44

## 2021-01-29 NOTE — PROGRESS NOTES
"Hammad Carpenter is a 35 y.o. male returns for     Chief Complaint   Patient presents with   • Right Shoulder - Follow-up       HISTORY OF PRESENT ILLNESS:  Patient in for MRI results of the right shoulder.   The Meloxicam seems to help some.   Certain movements hurt worse than others.   Still with pain at night if sleeps on right side       CONCURRENT MEDICAL HISTORY:    The following portions of the patient's history were reviewed and updated as appropriate: allergies, current medications, past family history, past medical history, past social history, past surgical history and problem list.     ROS  No fevers or chills.  No chest pain or shortness of air.  No GI or  disturbances.    PHYSICAL EXAMINATION:       Ht 180.3 cm (71\")   Wt 81.6 kg (180 lb)   BMI 25.10 kg/m²     Physical Exam  Constitutional:       General: He is not in acute distress.     Appearance: Normal appearance. He is well-developed. He is not ill-appearing.   Pulmonary:      Effort: Pulmonary effort is normal. No respiratory distress.   Neurological:      Mental Status: He is alert and oriented to person, place, and time.   Psychiatric:         Mood and Affect: Mood normal.         Behavior: Behavior normal.         Thought Content: Thought content normal.         Judgment: Judgment normal.           Right Shoulder Exam     Comments:  He has full range of motion but has a significant tenderness with Oliver Gerald test.  He has definite elevated clavicle laterally.  Negative load-and-shift test.  Negative sulcus test .  he does have some pain with internal and external rotation at 90 degrees.  Pain with cross shoulder adduction.              Xr Shoulder 2+ View Right    Result Date: 1/12/2021  Narrative: Ordering Provider:  Chris Lugo MD Ordering Diagnosis/Indication:  Right shoulder pain, unspecified chronicity Procedure:  XR SHOULDER 2+ VW RIGHT Exam Date:  1/12/21 COMPARISON:  Not applicable, no relevant images " available.     Impression:  3 views of the right shoulder show acceptable position and alignment of the glenohumeral joint with no evidence of acute bony abnormality.  He has a chronic stage III AC joint separation with some bony changes at the distal aspect of the clavicle.  There appears to be narrowing of the subacromial space but no prior x-rays are available for comparison.  There is some increased calcification on the superior aspect of the greater tuberosity which may indicate some impingement.  No acute findings. Chris Lugo MD 1/12/21     Mri Shoulder Right Arthrogram    Result Date: 1/22/2021  Narrative: EXAM: MRI SHOULDER ARTHROGRAM, RIGHT HISTORY: 35-year-old male with pain in the right shoulder. History of prior AC joint separation. COMPARISON:  Radiograph 1/12/2021. TECHNIQUE: Multiplanar, multisequence MR imaging of the right shoulder was performed after the uneventful intra-articular administration of gadolinium contrast. FINDINGS: Acromioclavicular joint and Coracoclavicular Interval:  Extensive intermediate signal scar tissue is seen within the right acromioclavicular joint space which is widened. There is elevation of the distal clavicle relative to the acromion on. The superior common clavicular ligaments are diffusely thickened. The coracoclavicular ligament is diffusely thickened and otherwise intact. The inferior acromioclavicular ligament is not well seen. There is chronic fragmentation of the distal clavicle consistent with old, healed injury. Rotator Cuff:  Supraspinatus demonstrates a partial-thickness (approximately 50%) undersurface tear of the footplate extending at least 1.3 cm in transverse dimension (coronal series 4 image 10/18). There is an tear involving the entirety of the supraspinatus humeral insertion which extends posteriorly into the confluence with the infraspinatus tendons anterior fibers. The insertional tear of the supraspinatus measures at least 1.4 cm in AP  dimension and the interspinalis portion also 1.4 cm in AP dimension. No tendinous retraction. Subscapularis demonstrates thickening and intermediate signal throughout its superior fibers with likely small focal insertional tears seen on sagittal series 3 image 8/16.  Teres minor appears intact. Muscle bulk and signal are are normal. Biceps Tendon:  Remains within the bicipital groove with partial tearing of the intra-articular portion best appreciated on sagittal series 3 image 9/16. Glenoid:  There is a tear of the superior labrum with posterior extension extending from the biceps labral anchor posteriorly best seen on coronal series 4 images 9 through 12/18 and axial series 2 image 8/20. Osseous glenoid is intact. Glenohumeral ligaments are intact. Osseous Structures and Cartilaginous Surfaces:  Cartilage of the glenohumeral joint is well preserved.  Bone marrow signal is unremarkable. Focal subcortical cystic degenerative changes of the posterior humeral head. Miscellaneous:  No axillary lymphadenopathy.  No abnormal soft tissue mass.  Visualized portion of the adjacent lung apex does not demonstrate obvious mass.     Impression: Partial thickness undersurface tear of the supraspinatus tendon. Insertional tears of the supraspinatus and infraspinatus tendons. Subscapularis tendinopathy with likely small insertional tears. Partial tear of the intra-articular biceps tendon. SLAP tear. Old acromioclavicular joint separation injury with prominent AC joint widening, distal clavicle elevation, and associated scar tissue. Electronically signed by:  Jose F Urbina MD  1/22/2021 3:55 PM CST Workstation: [id number]    Fl Contrast Injection Ct / Mri    Result Date: 1/22/2021  Narrative: Procedure: Fluoroscopic right shoulder arthrogram pre-MRI. Reason for exam: Right shoulder pain. FINDINGS: Patient presents for right shoulder arthrogram pre-MRI. Procedure, risks, and benefits were explained to the patient who gave  informed consent. Total fluoroscopic time was one minute nine seconds. Two images were obtained. Utilizing fluoroscopic guidance appropriate access point for right shoulder arthrogram was identified and skin surface was marked. The patient was sterilely prepped and draped and locally anesthetized lidocaine. A 22-gauge spinal needle was placed into the right shoulder joint space. Departmental contrast diluted solution was placed into the right shoulder joint space through the 22-gauge spinal needle. The needle was then removed. Patient tolerated procedure well. No immediate complications.     Impression: 1.  Successful right shoulder arthrogram. Electronically signed by:  Lance Berg MD  1/22/2021 3:09 PM [facility] Workstation: [id number]            ASSESSMENT:    Diagnoses and all orders for this visit:    Acromioclavicular joint separation, type 3, right, subsequent encounter    Right shoulder pain, unspecified chronicity  -     Ambulatory Referral to Physical Therapy Evaluate and treat  -     Large Joint Arthrocentesis: R subacromial bursa    Chronic right shoulder pain  -     Ambulatory Referral to Physical Therapy Evaluate and treat  -     Large Joint Arthrocentesis: R subacromial bursa    Partial nontraumatic tear of rotator cuff, right    Other orders  -     amphetamine-dextroamphetamine (ADDERALL) 10 MG tablet; Take 10 mg by mouth 3 (Three) Times a Day.          PLAN    The MRI does show partial rotator cuff tear.  He obviously has a chronic type III AC joint separation as well.  The MRI also indicates labral and superior labral tearing.  He is not have instability on exam.  We discussed a trial of physical therapy and steroid injection into the subacromial space of the right shoulder to try to isolate what the main issue for him is.  This would help to direct possible surgical intervention.    PT:   ROM/STR   Stability exercises   Will see how injection affects symptoms   May need surgical  intervention        Large Joint Arthrocentesis: R subacromial bursa  Date/Time: 1/29/2021 10:44 AM  Consent given by: patient  Site marked: site marked  Timeout: Immediately prior to procedure a time out was called to verify the correct patient, procedure, equipment, support staff and site/side marked as required   Supporting Documentation  Indications: pain   Procedure Details  Location: shoulder - R subacromial bursa  Preparation: Patient was prepped and draped in the usual sterile fashion  Needle size: 22 G  Approach: posterior  Medications administered: 40 mg triamcinolone acetonide 40 MG/ML; 2 mL lidocaine 1 %  Patient tolerance: patient tolerated the procedure well with no immediate complications        Return in about 6 weeks (around 3/12/2021) for recheck.    Chris Lugo MD

## 2021-01-31 RX ORDER — LIDOCAINE HYDROCHLORIDE 10 MG/ML
2 INJECTION, SOLUTION INFILTRATION; PERINEURAL
Status: COMPLETED | OUTPATIENT
Start: 2021-01-29 | End: 2021-01-29

## 2021-01-31 RX ORDER — TRIAMCINOLONE ACETONIDE 40 MG/ML
40 INJECTION, SUSPENSION INTRA-ARTICULAR; INTRAMUSCULAR
Status: COMPLETED | OUTPATIENT
Start: 2021-01-29 | End: 2021-01-29

## 2021-02-05 ENCOUNTER — APPOINTMENT (OUTPATIENT)
Dept: PHYSICAL THERAPY | Facility: HOSPITAL | Age: 36
End: 2021-02-05

## 2021-03-01 DIAGNOSIS — F90.0 ADHD, PREDOMINANTLY INATTENTIVE TYPE: Primary | Chronic | ICD-10-CM

## 2021-03-01 RX ORDER — DEXTROAMPHETAMINE SACCHARATE, AMPHETAMINE ASPARTATE, DEXTROAMPHETAMINE SULFATE AND AMPHETAMINE SULFATE 2.5; 2.5; 2.5; 2.5 MG/1; MG/1; MG/1; MG/1
10 TABLET ORAL 3 TIMES DAILY
Qty: 90 TABLET | Refills: 0 | Status: CANCELLED | OUTPATIENT
Start: 2021-03-01

## 2021-03-01 RX ORDER — DEXTROAMPHETAMINE SACCHARATE, AMPHETAMINE ASPARTATE, DEXTROAMPHETAMINE SULFATE AND AMPHETAMINE SULFATE 2.5; 2.5; 2.5; 2.5 MG/1; MG/1; MG/1; MG/1
10 TABLET ORAL 3 TIMES DAILY
Qty: 84 TABLET | Refills: 0 | Status: SHIPPED | OUTPATIENT
Start: 2021-03-01 | End: 2021-03-29 | Stop reason: SDUPTHER

## 2021-03-26 ENCOUNTER — OFFICE VISIT (OUTPATIENT)
Dept: FAMILY MEDICINE CLINIC | Facility: CLINIC | Age: 36
End: 2021-03-26

## 2021-03-26 VITALS
DIASTOLIC BLOOD PRESSURE: 80 MMHG | SYSTOLIC BLOOD PRESSURE: 122 MMHG | WEIGHT: 177.4 LBS | HEIGHT: 71 IN | BODY MASS INDEX: 24.84 KG/M2

## 2021-03-26 DIAGNOSIS — G40.909 NONINTRACTABLE EPILEPSY WITHOUT STATUS EPILEPTICUS, UNSPECIFIED EPILEPSY TYPE (HCC): Chronic | ICD-10-CM

## 2021-03-26 DIAGNOSIS — Z11.59 NEED FOR HEPATITIS C SCREENING TEST: ICD-10-CM

## 2021-03-26 DIAGNOSIS — F90.0 ADHD, PREDOMINANTLY INATTENTIVE TYPE: Primary | Chronic | ICD-10-CM

## 2021-03-26 PROBLEM — M25.511 CHRONIC RIGHT SHOULDER PAIN: Chronic | Status: ACTIVE | Noted: 2021-01-12

## 2021-03-26 PROBLEM — G89.29 CHRONIC RIGHT SHOULDER PAIN: Chronic | Status: ACTIVE | Noted: 2021-01-12

## 2021-03-26 PROCEDURE — 99213 OFFICE O/P EST LOW 20 MIN: CPT | Performed by: FAMILY MEDICINE

## 2021-03-26 NOTE — PROGRESS NOTES
Subjective   Hammad Carpenter is a 35 y.o. male.  Reevaluation seizure disorder ADHD chronic shoulder pain.  In the interim feels well no complaints medicines working well.  Still has not visited with his neurologist we have counseled on same.  Counseled signing up for Covid vaccine as well.    History of Present Illness   HPI    The following portions of the patient's history were reviewed and updated as appropriate: allergies, current medications, past family history, past medical history, past social history, past surgical history and problem list.    Review of Systems  Review of Systems   Constitutional: Negative for activity change, appetite change, fatigue and unexpected weight change.   HENT: Negative for trouble swallowing and voice change.    Eyes: Negative for redness and visual disturbance.   Respiratory: Negative for cough and wheezing.    Cardiovascular: Negative for chest pain and palpitations.   Gastrointestinal: Negative for abdominal pain, constipation, diarrhea, nausea and vomiting.   Genitourinary: Negative for urgency.   Musculoskeletal: Negative for joint swelling.   Neurological: Negative for syncope and headaches.   Hematological: Negative for adenopathy.   Psychiatric/Behavioral: Negative for sleep disturbance.       Objective   Physical Exam  Physical Exam  Constitutional:       Appearance: He is well-developed.   HENT:      Head: Normocephalic.      Right Ear: There is no impacted cerumen.   Eyes:      Pupils: Pupils are equal, round, and reactive to light.   Neck:      Thyroid: No thyromegaly.   Cardiovascular:      Rate and Rhythm: Normal rate.      Heart sounds: Normal heart sounds. No murmur heard.   No friction rub. No gallop.    Pulmonary:      Effort: Pulmonary effort is normal.   Abdominal:      General: There is no distension.      Palpations: Abdomen is soft. There is no mass.      Tenderness: There is no abdominal tenderness.   Musculoskeletal:         General: Normal range  "of motion.      Cervical back: Normal range of motion.   Skin:     General: Skin is warm and dry.   Neurological:      Mental Status: He is alert and oriented to person, place, and time.      Deep Tendon Reflexes: Reflexes are normal and symmetric.   Psychiatric:         Mood and Affect: Mood normal.         Speech: Speech normal.         Behavior: Behavior normal.         Thought Content: Thought content normal.         Judgment: Judgment normal.      Comments: Normal affect           Visit Vitals  /80   Ht 180.3 cm (71\")   Wt 80.5 kg (177 lb 6.4 oz)   BMI 24.74 kg/m²     Body mass index is 24.74 kg/m².      Assessment/Plan   Diagnoses and all orders for this visit:    1. ADHD, predominantly inattentive type (Primary)    2. Nonintractable epilepsy without status epilepticus, unspecified epilepsy type (CMS/HCC)    3. Need for hepatitis C screening test  -     Hepatitis C Antibody; Future    Counseled mainly on lifestyle measures signing up for Covid vaccine recheck 3 months continue current medication  "

## 2021-03-29 ENCOUNTER — TELEPHONE (OUTPATIENT)
Dept: FAMILY MEDICINE CLINIC | Facility: CLINIC | Age: 36
End: 2021-03-29

## 2021-03-29 DIAGNOSIS — F90.0 ADHD, PREDOMINANTLY INATTENTIVE TYPE: Chronic | ICD-10-CM

## 2021-03-29 RX ORDER — DEXTROAMPHETAMINE SACCHARATE, AMPHETAMINE ASPARTATE, DEXTROAMPHETAMINE SULFATE AND AMPHETAMINE SULFATE 2.5; 2.5; 2.5; 2.5 MG/1; MG/1; MG/1; MG/1
10 TABLET ORAL 3 TIMES DAILY
Qty: 84 TABLET | Refills: 0 | Status: SHIPPED | OUTPATIENT
Start: 2021-03-29 | End: 2021-04-26 | Stop reason: SDUPTHER

## 2021-04-01 NOTE — TELEPHONE ENCOUNTER
Needs 3rd Rx for Adderall.  His wife will  at noon today.  Thank you  
Patient needs refills on his amphetamine-dextroamphetamine (ADDERALL) 10 MG tablet. If possible send to [facility]. If it needs to be picked up he is asking to have ready by noon so his wife can . Please call him at [phone] and let him know.   
Patient seen and examined, agree with above, CAP/ poorly controlled DM/ DKA/ covid x2 neg, IV ABx, panx, Chest ct, IVF. insulin, MICU

## 2021-04-26 ENCOUNTER — TELEPHONE (OUTPATIENT)
Dept: FAMILY MEDICINE CLINIC | Facility: CLINIC | Age: 36
End: 2021-04-26

## 2021-04-26 DIAGNOSIS — F90.0 ADHD, PREDOMINANTLY INATTENTIVE TYPE: Chronic | ICD-10-CM

## 2021-04-26 RX ORDER — DEXTROAMPHETAMINE SACCHARATE, AMPHETAMINE ASPARTATE, DEXTROAMPHETAMINE SULFATE AND AMPHETAMINE SULFATE 2.5; 2.5; 2.5; 2.5 MG/1; MG/1; MG/1; MG/1
10 TABLET ORAL 3 TIMES DAILY
Qty: 84 TABLET | Refills: 0 | Status: SHIPPED | OUTPATIENT
Start: 2021-04-26 | End: 2021-05-24 | Stop reason: SDUPTHER

## 2021-05-24 ENCOUNTER — TELEPHONE (OUTPATIENT)
Dept: FAMILY MEDICINE CLINIC | Facility: CLINIC | Age: 36
End: 2021-05-24

## 2021-05-24 DIAGNOSIS — F90.0 ADHD, PREDOMINANTLY INATTENTIVE TYPE: Chronic | ICD-10-CM

## 2021-05-24 RX ORDER — DEXTROAMPHETAMINE SACCHARATE, AMPHETAMINE ASPARTATE, DEXTROAMPHETAMINE SULFATE AND AMPHETAMINE SULFATE 2.5; 2.5; 2.5; 2.5 MG/1; MG/1; MG/1; MG/1
10 TABLET ORAL 3 TIMES DAILY
Qty: 84 TABLET | Refills: 0 | Status: CANCELLED | OUTPATIENT
Start: 2021-05-24

## 2021-05-24 RX ORDER — DEXTROAMPHETAMINE SACCHARATE, AMPHETAMINE ASPARTATE, DEXTROAMPHETAMINE SULFATE AND AMPHETAMINE SULFATE 2.5; 2.5; 2.5; 2.5 MG/1; MG/1; MG/1; MG/1
10 TABLET ORAL 3 TIMES DAILY
Qty: 84 TABLET | Refills: 0 | Status: SHIPPED | OUTPATIENT
Start: 2021-05-24 | End: 2021-06-21 | Stop reason: SDUPTHER

## 2021-05-24 NOTE — TELEPHONE ENCOUNTER
Needing a refill on his adderall called to [facility] in [place]. Mr Jayden stated he is out of this medication.

## 2021-06-21 DIAGNOSIS — F90.0 ADHD, PREDOMINANTLY INATTENTIVE TYPE: Chronic | ICD-10-CM

## 2021-06-21 RX ORDER — DEXTROAMPHETAMINE SACCHARATE, AMPHETAMINE ASPARTATE, DEXTROAMPHETAMINE SULFATE AND AMPHETAMINE SULFATE 2.5; 2.5; 2.5; 2.5 MG/1; MG/1; MG/1; MG/1
10 TABLET ORAL 3 TIMES DAILY
Qty: 84 TABLET | Refills: 0 | Status: SHIPPED | OUTPATIENT
Start: 2021-06-21 | End: 2021-07-19 | Stop reason: SDUPTHER

## 2021-06-21 RX ORDER — DEXTROAMPHETAMINE SACCHARATE, AMPHETAMINE ASPARTATE, DEXTROAMPHETAMINE SULFATE AND AMPHETAMINE SULFATE 2.5; 2.5; 2.5; 2.5 MG/1; MG/1; MG/1; MG/1
10 TABLET ORAL 3 TIMES DAILY
Qty: 84 TABLET | Refills: 0 | Status: CANCELLED | OUTPATIENT
Start: 2021-06-21

## 2021-06-24 ENCOUNTER — OFFICE VISIT (OUTPATIENT)
Dept: FAMILY MEDICINE CLINIC | Facility: CLINIC | Age: 36
End: 2021-06-24

## 2021-06-24 VITALS
WEIGHT: 171.2 LBS | DIASTOLIC BLOOD PRESSURE: 74 MMHG | BODY MASS INDEX: 23.97 KG/M2 | SYSTOLIC BLOOD PRESSURE: 118 MMHG | HEIGHT: 71 IN

## 2021-06-24 DIAGNOSIS — F90.0 ADHD, PREDOMINANTLY INATTENTIVE TYPE: Primary | Chronic | ICD-10-CM

## 2021-06-24 DIAGNOSIS — G40.909 NONINTRACTABLE EPILEPSY WITHOUT STATUS EPILEPTICUS, UNSPECIFIED EPILEPSY TYPE (HCC): Chronic | ICD-10-CM

## 2021-06-24 DIAGNOSIS — G89.29 CHRONIC RIGHT SHOULDER PAIN: Chronic | ICD-10-CM

## 2021-06-24 DIAGNOSIS — M25.511 CHRONIC RIGHT SHOULDER PAIN: Chronic | ICD-10-CM

## 2021-06-24 PROCEDURE — 99213 OFFICE O/P EST LOW 20 MIN: CPT | Performed by: FAMILY MEDICINE

## 2021-06-24 RX ORDER — LAMOTRIGINE 200 MG/1
200 TABLET ORAL 2 TIMES DAILY
Qty: 180 TABLET | Refills: 1 | Status: SHIPPED | OUTPATIENT
Start: 2021-06-24 | End: 2022-03-25

## 2021-06-24 RX ORDER — LEVETIRACETAM 750 MG/1
TABLET ORAL
Qty: 180 TABLET | Refills: 1 | Status: SHIPPED | OUTPATIENT
Start: 2021-06-24 | End: 2022-03-18

## 2021-06-24 NOTE — PROGRESS NOTES
"Subjective   Hammad Carpenter is a 35 y.o. male. Evaluation ADHD and adult seizure disorder chronic shoulder pain. Spent some time discussing Covid and flu vaccine. Medicines working no seizure activity last lab work acceptable still having intermittent shoulder problems referred back to his orthopedist for same    History of Present Illness   HPI    The following portions of the patient's history were reviewed and updated as appropriate: allergies, current medications, past family history, past medical history, past social history, past surgical history and problem list.    Review of Systems  Review of Systems   Constitutional: Negative for activity change, appetite change, fatigue and unexpected weight change.   HENT: Negative for trouble swallowing and voice change.    Eyes: Negative for redness and visual disturbance.   Respiratory: Negative for cough and wheezing.    Cardiovascular: Negative for chest pain and palpitations.   Gastrointestinal: Negative for abdominal pain, constipation, diarrhea, nausea and vomiting.   Genitourinary: Negative for urgency.   Musculoskeletal: Positive for arthralgias. Negative for joint swelling.   Neurological: Negative for syncope and headaches.   Hematological: Negative for adenopathy.   Psychiatric/Behavioral: Negative for sleep disturbance.       Objective   Physical Exam  Physical Exam  Constitutional:       Appearance: Normal appearance. He is normal weight.   Cardiovascular:      Rate and Rhythm: Normal rate.   Neurological:      Mental Status: He is alert.   Psychiatric:         Mood and Affect: Mood normal.         Behavior: Behavior normal.         Thought Content: Thought content normal.         Judgment: Judgment normal.      Comments: Normal affect           Visit Vitals  /74   Ht 180.3 cm (71\")   Wt 77.7 kg (171 lb 3.2 oz)   BMI 23.88 kg/m²     Body mass index is 23.88 kg/m².      Assessment/Plan   Diagnoses and all orders for this visit:    1. ADHD, " predominantly inattentive type (Primary)    2. Nonintractable epilepsy without status epilepticus, unspecified epilepsy type (CMS/HCC)    3. Chronic right shoulder pain    Counseling getting vaccines  lifestyle measures recheck 3 months

## 2021-07-18 DIAGNOSIS — F90.0 ADHD, PREDOMINANTLY INATTENTIVE TYPE: Chronic | ICD-10-CM

## 2021-07-18 RX ORDER — DEXTROAMPHETAMINE SACCHARATE, AMPHETAMINE ASPARTATE, DEXTROAMPHETAMINE SULFATE AND AMPHETAMINE SULFATE 2.5; 2.5; 2.5; 2.5 MG/1; MG/1; MG/1; MG/1
10 TABLET ORAL 3 TIMES DAILY
Qty: 84 TABLET | Refills: 0 | Status: CANCELLED | OUTPATIENT
Start: 2021-07-18

## 2021-07-19 ENCOUNTER — TELEPHONE (OUTPATIENT)
Dept: FAMILY MEDICINE CLINIC | Facility: CLINIC | Age: 36
End: 2021-07-19

## 2021-07-19 DIAGNOSIS — F90.0 ADHD, PREDOMINANTLY INATTENTIVE TYPE: Chronic | ICD-10-CM

## 2021-07-19 RX ORDER — DEXTROAMPHETAMINE SACCHARATE, AMPHETAMINE ASPARTATE, DEXTROAMPHETAMINE SULFATE AND AMPHETAMINE SULFATE 2.5; 2.5; 2.5; 2.5 MG/1; MG/1; MG/1; MG/1
10 TABLET ORAL 3 TIMES DAILY
Qty: 84 TABLET | Refills: 0 | Status: SHIPPED | OUTPATIENT
Start: 2021-07-19 | End: 2021-08-16 | Stop reason: SDUPTHER

## 2021-08-14 DIAGNOSIS — F90.0 ADHD, PREDOMINANTLY INATTENTIVE TYPE: Chronic | ICD-10-CM

## 2021-08-16 DIAGNOSIS — F90.0 ADHD, PREDOMINANTLY INATTENTIVE TYPE: Chronic | ICD-10-CM

## 2021-08-16 RX ORDER — DEXTROAMPHETAMINE SACCHARATE, AMPHETAMINE ASPARTATE, DEXTROAMPHETAMINE SULFATE AND AMPHETAMINE SULFATE 2.5; 2.5; 2.5; 2.5 MG/1; MG/1; MG/1; MG/1
10 TABLET ORAL 3 TIMES DAILY
Qty: 84 TABLET | Refills: 0 | Status: CANCELLED | OUTPATIENT
Start: 2021-08-16

## 2021-08-16 RX ORDER — DEXTROAMPHETAMINE SACCHARATE, AMPHETAMINE ASPARTATE, DEXTROAMPHETAMINE SULFATE AND AMPHETAMINE SULFATE 2.5; 2.5; 2.5; 2.5 MG/1; MG/1; MG/1; MG/1
10 TABLET ORAL 3 TIMES DAILY
Qty: 84 TABLET | Refills: 0 | Status: SHIPPED | OUTPATIENT
Start: 2021-08-16 | End: 2021-09-10 | Stop reason: SDUPTHER

## 2021-09-10 DIAGNOSIS — F90.0 ADHD, PREDOMINANTLY INATTENTIVE TYPE: Chronic | ICD-10-CM

## 2021-09-10 RX ORDER — DEXTROAMPHETAMINE SACCHARATE, AMPHETAMINE ASPARTATE, DEXTROAMPHETAMINE SULFATE AND AMPHETAMINE SULFATE 2.5; 2.5; 2.5; 2.5 MG/1; MG/1; MG/1; MG/1
10 TABLET ORAL 3 TIMES DAILY
Qty: 84 TABLET | Refills: 0 | Status: SHIPPED | OUTPATIENT
Start: 2021-09-10 | End: 2021-10-08 | Stop reason: SDUPTHER

## 2021-09-10 RX ORDER — DEXTROAMPHETAMINE SACCHARATE, AMPHETAMINE ASPARTATE, DEXTROAMPHETAMINE SULFATE AND AMPHETAMINE SULFATE 2.5; 2.5; 2.5; 2.5 MG/1; MG/1; MG/1; MG/1
10 TABLET ORAL 3 TIMES DAILY
Qty: 84 TABLET | Refills: 0 | Status: CANCELLED | OUTPATIENT
Start: 2021-09-10

## 2021-09-24 ENCOUNTER — OFFICE VISIT (OUTPATIENT)
Dept: FAMILY MEDICINE CLINIC | Facility: CLINIC | Age: 36
End: 2021-09-24

## 2021-09-24 VITALS
SYSTOLIC BLOOD PRESSURE: 118 MMHG | BODY MASS INDEX: 23.94 KG/M2 | DIASTOLIC BLOOD PRESSURE: 74 MMHG | HEIGHT: 71 IN | WEIGHT: 171 LBS

## 2021-09-24 DIAGNOSIS — F90.0 ADHD, PREDOMINANTLY INATTENTIVE TYPE: Primary | Chronic | ICD-10-CM

## 2021-09-24 DIAGNOSIS — R06.83 SNORING: ICD-10-CM

## 2021-09-24 DIAGNOSIS — G40.909 NONINTRACTABLE EPILEPSY WITHOUT STATUS EPILEPTICUS, UNSPECIFIED EPILEPSY TYPE (HCC): Chronic | ICD-10-CM

## 2021-09-24 PROCEDURE — 99213 OFFICE O/P EST LOW 20 MIN: CPT | Performed by: FAMILY MEDICINE

## 2021-09-24 NOTE — PROGRESS NOTES
"Subjective   Hammad Carpenter is a 36 y.o. male.  Reevaluation ADHD seizure disorder.  In interim shoulder is much improved.  Still is taking regular medicine still is not visited with his neurologist we have encouraged same.  Wife states having some issues with snoring and possible sleep apnea and is need of study.  History noted.  Declines flu vaccine.    History of Present Illness   HPI    The following portions of the patient's history were reviewed and updated as appropriate: allergies, current medications, past family history, past medical history, past social history, past surgical history and problem list.    Review of Systems  Review of Systems   Constitutional: Negative for activity change, appetite change, fatigue and unexpected weight change.   HENT: Negative for trouble swallowing and voice change.    Eyes: Negative for redness and visual disturbance.   Respiratory: Negative for cough and wheezing.    Cardiovascular: Negative for chest pain and palpitations.   Gastrointestinal: Negative for abdominal pain, constipation, diarrhea, nausea and vomiting.   Genitourinary: Negative for urgency.   Musculoskeletal: Negative for joint swelling.   Neurological: Negative for syncope and headaches.   Hematological: Negative for adenopathy.   Psychiatric/Behavioral: Positive for sleep disturbance.       Objective   Physical Exam  Physical Exam  Constitutional:       Appearance: Normal appearance. He is obese.   Neurological:      Mental Status: He is alert.   Psychiatric:         Mood and Affect: Affect is blunt.         Speech: Speech normal.         Behavior: Behavior normal.         Thought Content: Thought content normal.         Cognition and Memory: Cognition normal.           Visit Vitals  /74   Ht 180.3 cm (71\")   Wt 77.6 kg (171 lb)   BMI 23.85 kg/m²     Body mass index is 23.85 kg/m².    /74   Ht 180.3 cm (71\")   Wt 77.6 kg (171 lb)   BMI 23.85 kg/m²     Assessment/Plan   Diagnoses and " all orders for this visit:    1. ADHD, predominantly inattentive type (Primary)    2. Nonintractable epilepsy without status epilepticus, unspecified epilepsy type (HCC)    3. Snoring  -     Ambulatory Referral to Sleep Medicine    Encourage neurology follow-up.  Continue medicines as outlined sleep study ordered recheck 3 months on schedule II drug

## 2021-10-08 ENCOUNTER — OFFICE VISIT (OUTPATIENT)
Dept: SLEEP MEDICINE | Facility: HOSPITAL | Age: 36
End: 2021-10-08

## 2021-10-08 VITALS
HEIGHT: 71 IN | BODY MASS INDEX: 24.47 KG/M2 | OXYGEN SATURATION: 99 % | DIASTOLIC BLOOD PRESSURE: 87 MMHG | SYSTOLIC BLOOD PRESSURE: 130 MMHG | WEIGHT: 174.8 LBS | HEART RATE: 75 BPM

## 2021-10-08 DIAGNOSIS — R06.83 SNORES: ICD-10-CM

## 2021-10-08 DIAGNOSIS — R06.81 WITNESSED EPISODE OF APNEA: ICD-10-CM

## 2021-10-08 DIAGNOSIS — F90.0 ADHD, PREDOMINANTLY INATTENTIVE TYPE: Chronic | ICD-10-CM

## 2021-10-08 DIAGNOSIS — G47.10 HYPERSOMNIA: ICD-10-CM

## 2021-10-08 DIAGNOSIS — R53.83 FATIGUE, UNSPECIFIED TYPE: Primary | ICD-10-CM

## 2021-10-08 PROCEDURE — 99203 OFFICE O/P NEW LOW 30 MIN: CPT | Performed by: INTERNAL MEDICINE

## 2021-10-08 RX ORDER — DEXTROAMPHETAMINE SACCHARATE, AMPHETAMINE ASPARTATE, DEXTROAMPHETAMINE SULFATE AND AMPHETAMINE SULFATE 2.5; 2.5; 2.5; 2.5 MG/1; MG/1; MG/1; MG/1
10 TABLET ORAL 3 TIMES DAILY
Qty: 84 TABLET | Refills: 0 | Status: CANCELLED | OUTPATIENT
Start: 2021-10-08

## 2021-10-08 RX ORDER — DEXTROAMPHETAMINE SACCHARATE, AMPHETAMINE ASPARTATE, DEXTROAMPHETAMINE SULFATE AND AMPHETAMINE SULFATE 2.5; 2.5; 2.5; 2.5 MG/1; MG/1; MG/1; MG/1
10 TABLET ORAL 3 TIMES DAILY
Qty: 84 TABLET | Refills: 0 | Status: SHIPPED | OUTPATIENT
Start: 2021-10-08 | End: 2021-11-04 | Stop reason: SDUPTHER

## 2021-10-08 NOTE — PROGRESS NOTES
"      [facility]  [address] [place] [address] Drive  [place], [place] [address]  Phone: [phone]  Fax: [phone]      New Patient Sleep Medicine Consultation  Sleep Visit Only    Encounter Date: 10/8/2021         Patient's PCP: Mack Pritchett MD  Referring provider: Mack Pritchett MD  Reason for consultation chief complaint: excessive daytime sleepiness    CHIEF COMPLAINT:  :   Chief Complaint   Patient presents with   • Fatigue     neck---  14\"   • Unable To Fall Asleep   • Daytime Sleepiness   • Morning Headaches   • Leg/body Jerks During Sleep   • Dry Mouth   • Snoring   • Frequent Awakenings   • Witnessed Apnea        Encounter Date: 10/8/2021           HISTORY OF PRESENT ILLNESS:  Referred by Provider for Sleep Evaluation for Loud Snoring and fatigue  Poor Sleep habits  36-year-old gentleman who has a history of attention deficit disorder on Adderall 10 mg 3 times daily patient has a history of seizure disorder on Keppra and Lamictal, and was referred due to lower snoring and question witnessed apnea by Dr. Pritchett.    Hammad Carpenter is a 36 y.o. male whose bedtime is ~ 9 pm . He  falls asleep after   Minutes watch TV, playing games Iphone, and is up 1 times per night. He wakes up ~ 4:45 am and 7-8 am weekends. He endorses 6 hours of sleep. He drinks 2 cups of coffee, No teas, and shayna sodas per day, He drinks energy drinks red bull, 2. He drinks no alcoholic beverages per week.    Hammad Carpenter admits to snoring, unrestful sleep, gasping during sleep, excessive daytime sleepiness, morning headaches, stop breathing during sleep, sleeping less than 6 hours per night, Disturbed or restless sleep, sleepy driving and difficulty falling asleep. He denies cataplexy, sleep paralysis, or hypnagogic hallucinations. He does not take sedatives or hypnotics. He has Some sleepiness with driving, never fallen asleep behind the wheel,  He naps rarely, maybe once a week.  He denied weight " gain last 6 months, He does not exercise regularly.    Family History MIHAELA: unknown    Brief Social History:  He is a never smoker.  Nicotine vaping: No  No POT smoker: No    Occupation:  Maintenance Work    PAST MEDICAL AND SURGICAL HISTORIES:    Past Medical History:   Diagnosis Date   • Drug therapy     long term   • Encounter for general adult medical examination without abnormal findings    • Epilepsy (HCC)    • Fatigue    • General medical exam     other: for administrative purposes   • Heartburn    • Right inguinal hernia     s/p lap rih repair      • Screening for lipoid disorders      Past Surgical History:   Procedure Laterality Date   • INJECTION OF MEDICATION  09/13/2013    Kenalog       Allergies   Allergen Reactions   • Lortab [Hydrocodone-Acetaminophen] Nausea And Vomiting   • Amoxicillin Unknown - Low Severity     Childhood reaction         Family History   Problem Relation Age of Onset   • Hypertension Other    • Diabetes Other      Social History     Socioeconomic History   • Marital status:      Spouse name: Not on file   • Number of children: Not on file   • Years of education: Not on file   • Highest education level: Not on file   Tobacco Use   • Smoking status: Never Smoker   • Smokeless tobacco: Never Used   Substance and Sexual Activity   • Drug use: Defer   • Sexual activity: Defer     MEDICATIONS:    Current Outpatient Medications:   •  lamoTRIgine (LaMICtal) 200 MG tablet, Take 1 tablet by mouth 2 (Two) Times a Day., Disp: 180 tablet, Rfl: 1  •  levETIRAcetam (KEPPRA) 750 MG tablet, TAKE ONE TABLET BY MOUTH TWICE A DAY, Disp: 180 tablet, Rfl: 1  •  amphetamine-dextroamphetamine (ADDERALL) 10 MG tablet, Take 1 tablet by mouth 3 (Three) Times a Day., Disp: 84 tablet, Rfl: 0      Review of Systems   Constitutional: Positive for fatigue.   HENT: Positive for congestion.    Eyes: Negative for pain.   Respiratory: Negative for shortness of breath.    Cardiovascular: Negative for chest pain  "and leg swelling.   Gastrointestinal: Negative for anal bleeding.   Endocrine: Negative for polydipsia.   Musculoskeletal: Positive for arthralgias (shoulder Pain). Negative for myalgias.   Skin: Negative for pallor.   Allergic/Immunologic: Negative for food allergies.   Neurological: Positive for seizures.   Hematological: Does not bruise/bleed easily.   Psychiatric/Behavioral: Positive for sleep disturbance.         OBJECTIVE:    Vitals:    10/08/21 1550   BP: 130/87   Pulse: 75   SpO2: 99%         10/08/21  1550   Weight: 79.3 kg (174 lb 12.8 oz)     Body mass index is 24.39 kg/m². Patient's Body mass index is 24.39 kg/m².     Neck circumference: 14\"  ESS: 13      PHYSICAL EXAM:          General: Alert. Cooperative. No acute distress.             Head:  Normocephalic. Symmetrical. Atraumatic.              Eyes: Sclera clear. No icterus.              Nose: Mild septal deviation. No drainage. No nasal Polyps             Neck:  Trachea midline, No Stridor, No Supraclavicular Adenopathy          Throat: No oral lesions. No thrush. Moist mucous membranes.    Tongue is normal    Dentition is good       Pharynx: Posterior pharyngeal pillars are wide    Mallampati score of II (hard and soft palate, upper portion of tonsils anduvula visible)    Pharynx is nonerythematous                     Lungs:  Clear to auscultation bilaterally. No wheezes. No rhonchi. No rales. Respirations regular, even and unlabored.            Heart:  Regular rhythm and normal rate. Normal S1 and S2. No murmur.     Abdomen:  Soft, non-tender and non-distended. Normal bowel sounds.  Extremities:  No edema, no Clubbing, No Cyanosis, upper extremity pulses palpable              Skin: No jaundice or rash, no nasal bridge ulceration           Neuro: Moves all 4 extremities  Psychiatric: Normal mood and affect.    IMPRESSION AND RECOMMENDATIONS:  36-year-old gentleman who was referred for sleep disturbance evaluation such as lower snoring, witnessed " apnea patient with Reubens Sleepiness Scale of 13, neck circumference 14 inches with a history of seizure disorders on Keppra and lamotrigine will and history of attention deficit disorder on Adderall.  Patient with very poor to sleep and wake habits, which I think may be playing an important role in patient's symptoms besides medication induced and insufficient sleep.    1. Fatigue, unspecified type  - We will request a home sleep study and if it is nondiagnostic we will proceed with in lab PSG  - Home Sleep Study; Future    2. Snores  - Home Sleep Study; Future    3. Hypersomnia  - Home Sleep Study; Future  - MIHAELA facts printed material has been provided to the patient  - Sleep better printed fact sheet has been provided to the patient  - Patient was advised to avoid electronics before bedtime and practice a regular sleep and wake habits      4. Witnessed episode of apnea  - We will request home sleep testing and if it is nondiagnostic, diagnostic PSG  - Home Sleep Study; Future    Contraindications to home sleep test: none    FOLLOW UP:  Return in about 4 weeks (around 11/5/2021) for Follow up after study.    Thank you very much for letting us to participate in the care of your patient.    Pineda Saeed MD, FACP, FCCP  Diplomate in Pulmonary & Sleep Medicine    This document has been electronically signed by Pineda Saeed MD on October 8, 2021 17:21 CDT      Instructions provided as documented in the After Visit Summary.  The patient indicated understanding of the diagnosis and agreed with the plan of care    EMR Dragon/Transcription disclaimer:   Some of this note may be an electronic transcription/translation of spoken language to printed text. The electronic translation of spoken language may permit erroneous, or at times, nonsensical words or phrases to be inadvertently transcribed; Although I have reviewed the note for such errors, some may still exist.      CC: Mack Pritchett MD Jones,  Mack VALLADARES MD

## 2021-10-08 NOTE — TELEPHONE ENCOUNTER
Incoming Refill Request      Medication requested (name and dose): [facility]    Pharmacy where request should be sent: [facility]    Additional details provided by patient:     Best call back number: [phone]    Does the patient have less than a 3 day supply:  [x] Yes  [] No    Bere Rondon Rep  10/08/21, 12:34 CDT

## 2021-10-13 ENCOUNTER — APPOINTMENT (OUTPATIENT)
Dept: SLEEP MEDICINE | Facility: HOSPITAL | Age: 36
End: 2021-10-13

## 2021-10-14 ENCOUNTER — HOSPITAL ENCOUNTER (OUTPATIENT)
Dept: SLEEP MEDICINE | Facility: HOSPITAL | Age: 36
Discharge: HOME | End: 2021-10-14
Admitting: INTERNAL MEDICINE

## 2021-10-14 DIAGNOSIS — G47.10 HYPERSOMNIA: ICD-10-CM

## 2021-10-14 DIAGNOSIS — R53.83 FATIGUE, UNSPECIFIED TYPE: ICD-10-CM

## 2021-10-14 DIAGNOSIS — R06.81 WITNESSED EPISODE OF APNEA: ICD-10-CM

## 2021-10-14 DIAGNOSIS — R06.83 SNORES: ICD-10-CM

## 2021-10-14 PROCEDURE — 95800 SLP STDY UNATTENDED: CPT

## 2021-10-14 PROCEDURE — 95800 SLP STDY UNATTENDED: CPT | Performed by: INTERNAL MEDICINE

## 2021-10-29 ENCOUNTER — OFFICE VISIT (OUTPATIENT)
Dept: SLEEP MEDICINE | Facility: HOSPITAL | Age: 36
End: 2021-10-29

## 2021-10-29 VITALS
OXYGEN SATURATION: 99 % | HEART RATE: 80 BPM | SYSTOLIC BLOOD PRESSURE: 132 MMHG | DIASTOLIC BLOOD PRESSURE: 88 MMHG | BODY MASS INDEX: 24.77 KG/M2 | HEIGHT: 71 IN | WEIGHT: 176.9 LBS

## 2021-10-29 DIAGNOSIS — F51.04 PSYCHOPHYSIOLOGICAL INSOMNIA: ICD-10-CM

## 2021-10-29 DIAGNOSIS — G47.19 EXCESSIVE DAYTIME SLEEPINESS: ICD-10-CM

## 2021-10-29 DIAGNOSIS — R06.83 SNORING: Primary | ICD-10-CM

## 2021-10-29 PROCEDURE — 99213 OFFICE O/P EST LOW 20 MIN: CPT | Performed by: NURSE PRACTITIONER

## 2021-10-29 NOTE — PROGRESS NOTES
Sleep Clinic Follow-Up    CHIEF COMPLAINT: follow up sleep testing    LAST OV: 10/08/2021 (I reviewed this note)    HPI:  The patient is a 36 y.o. male.  I discussed the results of the recent home sleep study performed on 10/14/2021. Total presumed sleep time was 6 hrs 30 minutes. The AHI/JAIME was 1.8. Mean O2 was 95% with a fab of 89%. Mean pulse was 71 BPM.       INTERVAL MEDICAL HISTORY: No change since last office visit in regard to the patient's bedtime routine, medications, or diagnosis.    PAP Data:  Currently not on therapy         Bed time: 2100  Sleep latency:  minutes  Number of times awakens during the night: 1  Wake time: 0445  Estimated total sleep time at night: 6 hours  Caffeine intake: 2 coffee, 0-1 soda   Alcohol intake: 0  Nap time: rare   Sleepiness with Driving: occasional- denies close call or accident related to falling asleep at the wheel      MEDICATIONS:   Current Outpatient Medications:   •  amphetamine-dextroamphetamine (ADDERALL) 10 MG tablet, Take 1 tablet by mouth 3 (Three) Times a Day., Disp: 84 tablet, Rfl: 0  •  lamoTRIgine (LaMICtal) 200 MG tablet, Take 1 tablet by mouth 2 (Two) Times a Day., Disp: 180 tablet, Rfl: 1  •  levETIRAcetam (KEPPRA) 750 MG tablet, TAKE ONE TABLET BY MOUTH TWICE A DAY, Disp: 180 tablet, Rfl: 1    PHYSICAL EXAM  Vitals:    10/29/21 1311   BP: 132/88   Pulse: 80   SpO2: 99%           10/29/21  1311   Weight: 80.2 kg (176 lb 14.4 oz)       Body mass index is 24.68 kg/m². Patient's Body mass index is 24.68 kg/m². indicating that he is within normal range (BMI 18.5-24.9). No BMI management plan needed..      Gen:  No acute distress heard in voice, alert, oriented  Eyes:    Moist conjunctiva, EOMI   Lungs:  Normal effort, non-labored breathing  Heart:  No lower extremity edema   Psych:  Appropriate affect   Neuro:  Cn2-12 appear intact     Assessment and Plan:    1. Excessive daytime sleepiness- Established, stable (1)  1. In lab PSG  2. Good sleep  hygiene   3. Drowsy driving tips- do not drive if feeling sleepy   4. Return to clinic 2 weeks after study for results   2. Snoring  1. PSG  3. Insomnia - sleep onset and or maintenance - Established, stable (1)   1. Good sleep hygiene   2. PSG  4. ADD on Adderall   6. Seizure disorder       The sleep results were discussed in detail with the patient. No sleep disordered breathing identified based on HST results. Recommended in lab PSG due to patient symptoms, ESS of 13 and home study may under diagnose sleep apnea. He agreed to further testing. The risks of untreated sleep apnea were reviewed.  I counseled patient on PSG, sleep hygiene, including regular sleep wake schedule and stimulus control therapy, the importance of safe driving and abstaining from smoking and alcohol consumption, as well as other sedatives.       All of the patient's questions were answered. He states understanding and agreement with my assessment and plan as above.     I obtained a brief history from the patient, reviewed the medical problems and current medications, and made medical decisions regarding treatment based on that information. Total visit time 20 min, with total of 15 minutes spent counseling patient regarding: PAP therapy, Lifestyle modifications, Sleep hygiene, Study results and Further testing.      RTC 2 weeks after testing   He agrees to return sooner on a prn basis if sx change.           This document has been electronically signed by ESTEBAN Joshua on October 29, 2021 13:12 CDT            CC: Mack Pritchett MD          No ref. provider found

## 2021-11-04 DIAGNOSIS — F90.0 ADHD, PREDOMINANTLY INATTENTIVE TYPE: Chronic | ICD-10-CM

## 2021-11-04 RX ORDER — DEXTROAMPHETAMINE SACCHARATE, AMPHETAMINE ASPARTATE, DEXTROAMPHETAMINE SULFATE AND AMPHETAMINE SULFATE 2.5; 2.5; 2.5; 2.5 MG/1; MG/1; MG/1; MG/1
10 TABLET ORAL 3 TIMES DAILY
Qty: 84 TABLET | Refills: 0 | Status: CANCELLED | OUTPATIENT
Start: 2021-11-04

## 2021-11-04 RX ORDER — DEXTROAMPHETAMINE SACCHARATE, AMPHETAMINE ASPARTATE, DEXTROAMPHETAMINE SULFATE AND AMPHETAMINE SULFATE 2.5; 2.5; 2.5; 2.5 MG/1; MG/1; MG/1; MG/1
10 TABLET ORAL 3 TIMES DAILY
Qty: 84 TABLET | Refills: 0 | Status: SHIPPED | OUTPATIENT
Start: 2021-11-04 | End: 2021-12-02 | Stop reason: SDUPTHER

## 2021-11-04 NOTE — TELEPHONE ENCOUNTER
Incoming Refill Request      Medication requested (name and dose): Adderall 10mg    Ph[facility] where [facility] be sent[facility]    Additional details provided by patient:     Best call back number: [phone]    Does the patient have less than a 3 day supply:  [x] Yes  [] No    Bere Cerda Rep  11/04/21, 12:33 CDT

## 2021-12-02 DIAGNOSIS — F90.0 ADHD, PREDOMINANTLY INATTENTIVE TYPE: Chronic | ICD-10-CM

## 2021-12-02 RX ORDER — DEXTROAMPHETAMINE SACCHARATE, AMPHETAMINE ASPARTATE, DEXTROAMPHETAMINE SULFATE AND AMPHETAMINE SULFATE 2.5; 2.5; 2.5; 2.5 MG/1; MG/1; MG/1; MG/1
10 TABLET ORAL 3 TIMES DAILY
Qty: 84 TABLET | Refills: 0 | Status: CANCELLED | OUTPATIENT
Start: 2021-12-02

## 2021-12-02 RX ORDER — DEXTROAMPHETAMINE SACCHARATE, AMPHETAMINE ASPARTATE, DEXTROAMPHETAMINE SULFATE AND AMPHETAMINE SULFATE 2.5; 2.5; 2.5; 2.5 MG/1; MG/1; MG/1; MG/1
10 TABLET ORAL 3 TIMES DAILY
Qty: 84 TABLET | Refills: 0 | Status: SHIPPED | OUTPATIENT
Start: 2021-12-02 | End: 2021-12-03 | Stop reason: SDUPTHER

## 2021-12-02 NOTE — TELEPHONE ENCOUNTER
Incoming Refill Request      Medication requested (name and dose): [facility]     Pharmacy where request should be sent: [facility]    Additional details provided by patient:     Best call back number: [phone]    Does the patient have less than a 3 day supply:  [] Yes  [x] No    Bere Rondon Rep  12/02/21, 10:27 CST

## 2021-12-03 ENCOUNTER — TELEPHONE (OUTPATIENT)
Dept: FAMILY MEDICINE CLINIC | Facility: CLINIC | Age: 36
End: 2021-12-03

## 2021-12-03 DIAGNOSIS — F90.0 ADHD, PREDOMINANTLY INATTENTIVE TYPE: Chronic | ICD-10-CM

## 2021-12-03 PROBLEM — R06.81 WITNESSED EPISODE OF APNEA: Status: RESOLVED | Noted: 2021-10-08 | Resolved: 2021-12-03

## 2021-12-03 RX ORDER — DEXTROAMPHETAMINE SACCHARATE, AMPHETAMINE ASPARTATE, DEXTROAMPHETAMINE SULFATE AND AMPHETAMINE SULFATE 2.5; 2.5; 2.5; 2.5 MG/1; MG/1; MG/1; MG/1
10 TABLET ORAL 3 TIMES DAILY
Qty: 84 TABLET | Refills: 0 | Status: SHIPPED | OUTPATIENT
Start: 2021-12-03 | End: 2021-12-30 | Stop reason: SDUPTHER

## 2021-12-03 NOTE — TELEPHONE ENCOUNTER
Mr. Carpenter called asking if Dr Pritchett would call [facility] and ok him to  his adderall prescription a day early. He states that it is an inconvenience for him to have to leave work on 12-6-21 to pick this up. Please call @ [phone]

## 2021-12-15 PROBLEM — R11.0 NAUSEA: Status: ACTIVE | Noted: 2021-12-15

## 2021-12-15 PROBLEM — R19.5 LOOSE STOOLS: Status: ACTIVE | Noted: 2021-12-15

## 2021-12-15 PROBLEM — J02.9 SORE THROAT: Status: ACTIVE | Noted: 2021-12-15

## 2021-12-15 PROBLEM — B34.9 VIRAL ILLNESS: Status: ACTIVE | Noted: 2021-12-15

## 2021-12-15 PROBLEM — R53.83 DECREASED ENERGY: Status: ACTIVE | Noted: 2021-12-15

## 2021-12-15 PROBLEM — R09.81 NASAL CONGESTION: Status: ACTIVE | Noted: 2021-12-15

## 2021-12-15 PROBLEM — Z20.822 ENCOUNTER FOR LABORATORY TESTING FOR COVID-19 VIRUS: Status: ACTIVE | Noted: 2021-12-15

## 2021-12-15 PROBLEM — R63.0 DECREASED APPETITE: Status: ACTIVE | Noted: 2021-12-15

## 2021-12-21 ENCOUNTER — OFFICE VISIT (OUTPATIENT)
Dept: FAMILY MEDICINE CLINIC | Facility: CLINIC | Age: 36
End: 2021-12-21

## 2021-12-21 VITALS
WEIGHT: 181 LBS | DIASTOLIC BLOOD PRESSURE: 66 MMHG | BODY MASS INDEX: 25.34 KG/M2 | SYSTOLIC BLOOD PRESSURE: 118 MMHG | HEIGHT: 71 IN

## 2021-12-21 DIAGNOSIS — F90.0 ADHD, PREDOMINANTLY INATTENTIVE TYPE: Primary | Chronic | ICD-10-CM

## 2021-12-21 DIAGNOSIS — G89.29 CHRONIC RIGHT SHOULDER PAIN: Chronic | ICD-10-CM

## 2021-12-21 DIAGNOSIS — M25.511 CHRONIC RIGHT SHOULDER PAIN: Chronic | ICD-10-CM

## 2021-12-21 DIAGNOSIS — G40.909 NONINTRACTABLE EPILEPSY WITHOUT STATUS EPILEPTICUS, UNSPECIFIED EPILEPSY TYPE (HCC): Chronic | ICD-10-CM

## 2021-12-21 PROBLEM — G47.10 HYPERSOMNIA: Chronic | Status: ACTIVE | Noted: 2021-10-08

## 2021-12-21 PROBLEM — R09.81 NASAL CONGESTION: Status: RESOLVED | Noted: 2021-12-15 | Resolved: 2021-12-21

## 2021-12-21 PROBLEM — R06.83 SNORES: Status: RESOLVED | Noted: 2021-10-08 | Resolved: 2021-12-21

## 2021-12-21 PROBLEM — R11.0 NAUSEA: Status: RESOLVED | Noted: 2021-12-15 | Resolved: 2021-12-21

## 2021-12-21 PROBLEM — R19.5 LOOSE STOOLS: Status: RESOLVED | Noted: 2021-12-15 | Resolved: 2021-12-21

## 2021-12-21 PROBLEM — Z20.822 ENCOUNTER FOR LABORATORY TESTING FOR COVID-19 VIRUS: Status: RESOLVED | Noted: 2021-12-15 | Resolved: 2021-12-21

## 2021-12-21 PROBLEM — B34.9 VIRAL ILLNESS: Status: RESOLVED | Noted: 2021-12-15 | Resolved: 2021-12-21

## 2021-12-21 PROBLEM — R63.0 DECREASED APPETITE: Status: RESOLVED | Noted: 2021-12-15 | Resolved: 2021-12-21

## 2021-12-21 PROBLEM — R53.83 DECREASED ENERGY: Status: RESOLVED | Noted: 2021-12-15 | Resolved: 2021-12-21

## 2021-12-21 PROBLEM — J02.9 SORE THROAT: Status: RESOLVED | Noted: 2021-12-15 | Resolved: 2021-12-21

## 2021-12-21 PROBLEM — R53.83 FATIGUE: Status: RESOLVED | Noted: 2021-10-08 | Resolved: 2021-12-21

## 2021-12-21 PROCEDURE — 99213 OFFICE O/P EST LOW 20 MIN: CPT | Performed by: FAMILY MEDICINE

## 2021-12-21 RX ORDER — MELOXICAM 15 MG/1
15 TABLET ORAL DAILY
Qty: 30 TABLET | Refills: 5 | Status: SHIPPED | OUTPATIENT
Start: 2021-12-21 | End: 2022-08-15

## 2021-12-21 NOTE — PROGRESS NOTES
"Subjective   Hammad Carpenter is a 36 y.o. male.  Follow-up ADHD seizure disorder chronic shoulder pain.  Feels well no complaint still having issues sleeping a lot is elective.  Requesting Mobic refill for his chronic shoulder pain.  History noted.    History of Present Illness   HPI    The following portions of the patient's history were reviewed and updated as appropriate: allergies, current medications, past family history, past medical history, past social history, past surgical history and problem list.    Review of Systems  Review of Systems   Constitutional: Negative for activity change, appetite change, fatigue and unexpected weight change.   HENT: Negative for trouble swallowing and voice change.    Eyes: Negative for redness and visual disturbance.   Respiratory: Negative for cough and wheezing.    Cardiovascular: Negative for chest pain and palpitations.   Gastrointestinal: Negative for abdominal pain, constipation, diarrhea, nausea and vomiting.   Genitourinary: Negative for urgency.   Musculoskeletal: Positive for arthralgias. Negative for joint swelling.   Skin: Positive for wound (Infected hair follicle cheek improved actually healed).   Neurological: Negative for syncope and headaches.   Hematological: Negative for adenopathy.   Psychiatric/Behavioral: Negative for sleep disturbance.       Objective   Physical Exam  Physical Exam  Constitutional:       Appearance: Normal appearance. He is normal weight.   Neurological:      Mental Status: He is alert.   Psychiatric:         Attention and Perception: Attention normal.         Mood and Affect: Mood normal.         Speech: Speech normal.         Behavior: Behavior normal.         Thought Content: Thought content normal.         Cognition and Memory: Cognition normal.           Visit Vitals  /66   Ht 180.3 cm (71\")   Wt 82.1 kg (181 lb)   BMI 25.24 kg/m²     Body mass index is 25.24 kg/m².    /66   Ht 180.3 cm (71\")   Wt 82.1 kg (181 " lb)   BMI 25.24 kg/m²     Assessment/Plan   Diagnoses and all orders for this visit:    1. ADHD, predominantly inattentive type (Primary)    2. Chronic right shoulder pain  -     meloxicam (MOBIC) 15 MG tablet; Take 1 tablet by mouth Daily. Prn shoulder  Dispense: 30 tablet; Refill: 5    3. Nonintractable epilepsy without status epilepticus, unspecified epilepsy type (HCC)    Mobic as needed counseled mainly on lifestyle measures recheck 3 months on schedule drug

## 2021-12-30 DIAGNOSIS — F90.0 ADHD, PREDOMINANTLY INATTENTIVE TYPE: Chronic | ICD-10-CM

## 2021-12-30 RX ORDER — DEXTROAMPHETAMINE SACCHARATE, AMPHETAMINE ASPARTATE, DEXTROAMPHETAMINE SULFATE AND AMPHETAMINE SULFATE 2.5; 2.5; 2.5; 2.5 MG/1; MG/1; MG/1; MG/1
10 TABLET ORAL 3 TIMES DAILY
Qty: 84 TABLET | Refills: 0 | Status: SHIPPED | OUTPATIENT
Start: 2021-12-30 | End: 2022-01-31 | Stop reason: SDUPTHER

## 2021-12-30 NOTE — TELEPHONE ENCOUNTER
Incoming Refill Request      Medication requested (name and dose): Adderall 10mg    Pharmacy where request should be sent: [facility]     Additional details provided by patient: a little early he said but he wanted to get it called in before the weekend so he has them     Best call back number: [phone]    Does the patient have less than a 3 day supply:  [] Yes  [x] No    Bere Cerda Rep  12/30/21, 09:47 CST

## 2021-12-30 NOTE — TELEPHONE ENCOUNTER
Dr. OUMOU Pritchett Pt.    Last Script  12/03/2021  #84, NR    Next Jovanna 03/25/2022    Last OV 12/21/2021

## 2022-01-21 ENCOUNTER — APPOINTMENT (OUTPATIENT)
Dept: SLEEP MEDICINE | Facility: HOSPITAL | Age: 37
End: 2022-01-21

## 2022-01-27 DIAGNOSIS — F90.0 ADHD, PREDOMINANTLY INATTENTIVE TYPE: Chronic | ICD-10-CM

## 2022-01-27 RX ORDER — DEXTROAMPHETAMINE SACCHARATE, AMPHETAMINE ASPARTATE, DEXTROAMPHETAMINE SULFATE AND AMPHETAMINE SULFATE 2.5; 2.5; 2.5; 2.5 MG/1; MG/1; MG/1; MG/1
10 TABLET ORAL 3 TIMES DAILY
Qty: 84 TABLET | Refills: 0 | OUTPATIENT
Start: 2022-01-27

## 2022-01-27 NOTE — TELEPHONE ENCOUNTER
Incoming Refill Request      Medication requested (name and dose): amphetamine-dextroamphetamine (ADDERALL) 10 MG tablet    [facility] where [facility] be sent[facility]     Additional details provided by patient:     Best call back number: [phone]    Does the patient have less than a 3 day supply:  [] Yes  [x] No    Bere Fabian Rep  01/27/22, 08:51 CST

## 2022-01-31 DIAGNOSIS — F90.0 ADHD, PREDOMINANTLY INATTENTIVE TYPE: Chronic | ICD-10-CM

## 2022-01-31 RX ORDER — DEXTROAMPHETAMINE SACCHARATE, AMPHETAMINE ASPARTATE, DEXTROAMPHETAMINE SULFATE AND AMPHETAMINE SULFATE 2.5; 2.5; 2.5; 2.5 MG/1; MG/1; MG/1; MG/1
10 TABLET ORAL 3 TIMES DAILY
Qty: 84 TABLET | Refills: 0 | Status: SHIPPED | OUTPATIENT
Start: 2022-01-31 | End: 2022-02-24 | Stop reason: SDUPTHER

## 2022-01-31 RX ORDER — DEXTROAMPHETAMINE SACCHARATE, AMPHETAMINE ASPARTATE, DEXTROAMPHETAMINE SULFATE AND AMPHETAMINE SULFATE 2.5; 2.5; 2.5; 2.5 MG/1; MG/1; MG/1; MG/1
10 TABLET ORAL 3 TIMES DAILY
Qty: 84 TABLET | Refills: 0 | Status: CANCELLED | OUTPATIENT
Start: 2022-01-31

## 2022-01-31 NOTE — TELEPHONE ENCOUNTER
Incoming Refill Request      Medication requested (name and dose): amphetamine-dextroamphetamine (ADDERALL) 10 MG tablet    Pharmacy where request should be sent: [facility]    Additional details provided by patient: call last Friday   Best call back number: [phone]    Does the patient have less than a 3 day supply:  [x] Yes  [] No    Bere Fabian Rep  01/31/22, 09:55 CST

## 2022-02-24 DIAGNOSIS — F90.0 ADHD, PREDOMINANTLY INATTENTIVE TYPE: Chronic | ICD-10-CM

## 2022-02-24 RX ORDER — DEXTROAMPHETAMINE SACCHARATE, AMPHETAMINE ASPARTATE, DEXTROAMPHETAMINE SULFATE AND AMPHETAMINE SULFATE 2.5; 2.5; 2.5; 2.5 MG/1; MG/1; MG/1; MG/1
10 TABLET ORAL 3 TIMES DAILY
Qty: 84 TABLET | Refills: 0 | Status: CANCELLED | OUTPATIENT
Start: 2022-02-24

## 2022-02-24 RX ORDER — DEXTROAMPHETAMINE SACCHARATE, AMPHETAMINE ASPARTATE, DEXTROAMPHETAMINE SULFATE AND AMPHETAMINE SULFATE 2.5; 2.5; 2.5; 2.5 MG/1; MG/1; MG/1; MG/1
10 TABLET ORAL 3 TIMES DAILY
Qty: 84 TABLET | Refills: 0 | Status: SHIPPED | OUTPATIENT
Start: 2022-02-24 | End: 2022-03-25 | Stop reason: SDUPTHER

## 2022-02-24 NOTE — TELEPHONE ENCOUNTER
Incoming Refill Request      Medication requested (name and dose): adderall 10mg    Pharmacy where request should be sent: [facility]    Additional details provided by patient: none    Best call back number: [phone]    Does the patient have less than a 3 day supply:  [] Yes  [x] No    Sierra Keller  02/24/22, 12:24 CST

## 2022-03-18 DIAGNOSIS — G40.909 NONINTRACTABLE EPILEPSY WITHOUT STATUS EPILEPTICUS, UNSPECIFIED EPILEPSY TYPE: Chronic | ICD-10-CM

## 2022-03-18 RX ORDER — LEVETIRACETAM 750 MG/1
TABLET ORAL
Qty: 180 TABLET | Refills: 1 | Status: SHIPPED | OUTPATIENT
Start: 2022-03-18 | End: 2022-09-30 | Stop reason: SDUPTHER

## 2022-03-25 ENCOUNTER — OFFICE VISIT (OUTPATIENT)
Dept: FAMILY MEDICINE CLINIC | Facility: CLINIC | Age: 37
End: 2022-03-25

## 2022-03-25 VITALS
HEIGHT: 71 IN | BODY MASS INDEX: 25.2 KG/M2 | SYSTOLIC BLOOD PRESSURE: 118 MMHG | DIASTOLIC BLOOD PRESSURE: 80 MMHG | WEIGHT: 180 LBS

## 2022-03-25 DIAGNOSIS — G40.909 NONINTRACTABLE EPILEPSY WITHOUT STATUS EPILEPTICUS, UNSPECIFIED EPILEPSY TYPE: Chronic | ICD-10-CM

## 2022-03-25 DIAGNOSIS — Z11.59 NEED FOR HEPATITIS C SCREENING TEST: Primary | ICD-10-CM

## 2022-03-25 DIAGNOSIS — G47.10 HYPERSOMNIA: Chronic | ICD-10-CM

## 2022-03-25 DIAGNOSIS — F90.0 ADHD, PREDOMINANTLY INATTENTIVE TYPE: Chronic | ICD-10-CM

## 2022-03-25 PROCEDURE — 99214 OFFICE O/P EST MOD 30 MIN: CPT | Performed by: FAMILY MEDICINE

## 2022-03-25 RX ORDER — LAMOTRIGINE 200 MG/1
TABLET ORAL
Qty: 180 TABLET | Refills: 1 | Status: SHIPPED | OUTPATIENT
Start: 2022-03-25 | End: 2022-09-19

## 2022-03-25 RX ORDER — DEXTROAMPHETAMINE SACCHARATE, AMPHETAMINE ASPARTATE, DEXTROAMPHETAMINE SULFATE AND AMPHETAMINE SULFATE 2.5; 2.5; 2.5; 2.5 MG/1; MG/1; MG/1; MG/1
10 TABLET ORAL 3 TIMES DAILY
Qty: 84 TABLET | Refills: 0 | Status: SHIPPED | OUTPATIENT
Start: 2022-03-25 | End: 2022-04-22 | Stop reason: SDUPTHER

## 2022-03-25 NOTE — PROGRESS NOTES
"Subjective   Hammad Carpenter is a 36 y.o. male.  Reevaluation ADHD epilepsy hypersomnia in interim feels well no complaints medicines holding is getting proper fills.  Is time for lab work.  History noted.  History of Present Illness   HPI    The following portions of the patient's history were reviewed and updated as appropriate: allergies, current medications, past family history, past medical history, past social history, past surgical history and problem list.    Review of Systems  Review of Systems   Constitutional: Negative for activity change, appetite change, fatigue and unexpected weight change.   HENT: Negative for trouble swallowing and voice change.    Eyes: Negative for redness and visual disturbance.   Respiratory: Negative for cough and wheezing.    Cardiovascular: Negative for chest pain and palpitations.   Gastrointestinal: Negative for abdominal pain, constipation, diarrhea, nausea and vomiting.   Genitourinary: Negative for urgency.   Musculoskeletal: Negative for joint swelling.   Neurological: Negative for syncope and headaches.   Hematological: Negative for adenopathy.   Psychiatric/Behavioral: Negative for sleep disturbance.       Objective   Physical Exam  Physical Exam  Constitutional:       Appearance: Normal appearance. He is normal weight.   Neurological:      Mental Status: He is alert.   Psychiatric:         Mood and Affect: Mood normal.         Behavior: Behavior normal.         Thought Content: Thought content normal.         Judgment: Judgment normal.      Comments: Normal affect           Visit Vitals  /80   Ht 180.3 cm (71\")   Wt 81.6 kg (180 lb)   BMI 25.10 kg/m²     Body mass index is 25.1 kg/m².    /80   Ht 180.3 cm (71\")   Wt 81.6 kg (180 lb)   BMI 25.10 kg/m²     Assessment/Plan   Diagnoses and all orders for this visit:    1. Need for hepatitis C screening test (Primary)  -     Hepatitis C Antibody    2. ADHD, predominantly inattentive type  -     " amphetamine-dextroamphetamine (ADDERALL) 10 MG tablet; Take 1 tablet by mouth 3 (Three) Times a Day.  Dispense: 84 tablet; Refill: 0  -     Hepatitis C Antibody    3. Nonintractable epilepsy without status epilepticus, unspecified epilepsy type (HCC)  -     CBC (No Diff)  -     Comprehensive Metabolic Panel    4. Hypersomnia    Lab work today counseled on same recheck 3

## 2022-04-22 DIAGNOSIS — F90.0 ADHD, PREDOMINANTLY INATTENTIVE TYPE: Chronic | ICD-10-CM

## 2022-04-22 RX ORDER — DEXTROAMPHETAMINE SACCHARATE, AMPHETAMINE ASPARTATE, DEXTROAMPHETAMINE SULFATE AND AMPHETAMINE SULFATE 2.5; 2.5; 2.5; 2.5 MG/1; MG/1; MG/1; MG/1
10 TABLET ORAL 3 TIMES DAILY
Qty: 84 TABLET | Refills: 0 | Status: SHIPPED | OUTPATIENT
Start: 2022-04-22 | End: 2022-05-20 | Stop reason: SDUPTHER

## 2022-04-22 RX ORDER — DEXTROAMPHETAMINE SACCHARATE, AMPHETAMINE ASPARTATE, DEXTROAMPHETAMINE SULFATE AND AMPHETAMINE SULFATE 2.5; 2.5; 2.5; 2.5 MG/1; MG/1; MG/1; MG/1
10 TABLET ORAL 3 TIMES DAILY
Qty: 84 TABLET | Refills: 0 | Status: CANCELLED | OUTPATIENT
Start: 2022-04-22

## 2022-04-22 NOTE — TELEPHONE ENCOUNTER
Incoming Refill Request      Medication requested (name and dose): amphetamine-dextroamphetamine (ADDERALL) 10 MG tablet    Ph[facility]acy where request should be sent[facility]  Additional details provided by patient: has 3 day supply only   Best call back number: [phone]    Does the patient have less than a 3 day supply:  [x] Yes  [] No    Bere Fabian Rep  04/22/22, 09:50 CDT

## 2022-05-20 DIAGNOSIS — F90.0 ADHD, PREDOMINANTLY INATTENTIVE TYPE: Chronic | ICD-10-CM

## 2022-05-20 RX ORDER — DEXTROAMPHETAMINE SACCHARATE, AMPHETAMINE ASPARTATE, DEXTROAMPHETAMINE SULFATE AND AMPHETAMINE SULFATE 2.5; 2.5; 2.5; 2.5 MG/1; MG/1; MG/1; MG/1
10 TABLET ORAL 3 TIMES DAILY
Qty: 84 TABLET | Refills: 0 | Status: SHIPPED | OUTPATIENT
Start: 2022-05-20 | End: 2022-06-17 | Stop reason: SDUPTHER

## 2022-05-20 RX ORDER — DEXTROAMPHETAMINE SACCHARATE, AMPHETAMINE ASPARTATE, DEXTROAMPHETAMINE SULFATE AND AMPHETAMINE SULFATE 2.5; 2.5; 2.5; 2.5 MG/1; MG/1; MG/1; MG/1
10 TABLET ORAL 3 TIMES DAILY
Qty: 84 TABLET | Refills: 0 | Status: CANCELLED | OUTPATIENT
Start: 2022-05-20

## 2022-05-20 NOTE — TELEPHONE ENCOUNTER
Incoming Refill Request      Medication requested (name and dose): amphetamine-dextroamphetamine (ADDERALL) 10 MG tablet    [facility] where [facility] be sent[facility]    Additional details provided by patient: NONE    Best call back number: [phone]    Does the patient have less than a 3 day supply:  [] Yes  [x] No    Bere Yang Rep  05/20/22, 15:02 CDT

## 2022-06-17 DIAGNOSIS — F90.0 ADHD, PREDOMINANTLY INATTENTIVE TYPE: Chronic | ICD-10-CM

## 2022-06-17 RX ORDER — DEXTROAMPHETAMINE SACCHARATE, AMPHETAMINE ASPARTATE, DEXTROAMPHETAMINE SULFATE AND AMPHETAMINE SULFATE 2.5; 2.5; 2.5; 2.5 MG/1; MG/1; MG/1; MG/1
10 TABLET ORAL 3 TIMES DAILY
Qty: 84 TABLET | Refills: 0 | Status: CANCELLED | OUTPATIENT
Start: 2022-06-17

## 2022-06-17 RX ORDER — DEXTROAMPHETAMINE SACCHARATE, AMPHETAMINE ASPARTATE, DEXTROAMPHETAMINE SULFATE AND AMPHETAMINE SULFATE 2.5; 2.5; 2.5; 2.5 MG/1; MG/1; MG/1; MG/1
10 TABLET ORAL 3 TIMES DAILY
Qty: 84 TABLET | Refills: 0 | Status: SHIPPED | OUTPATIENT
Start: 2022-06-17 | End: 2022-07-15 | Stop reason: SDUPTHER

## 2022-06-17 NOTE — TELEPHONE ENCOUNTER
Incoming Refill Request      Medication requested (name and dose): [facility]    Pharmacy where request should be sent: [facility]    Additional details provided by patient:    Best call back number: [phone]    Does the patient have less than a 3 day supply:  [] Yes  [x] No    Bere Rondon Rep  06/17/22, 08:34 CDT

## 2022-07-01 ENCOUNTER — OFFICE VISIT (OUTPATIENT)
Dept: FAMILY MEDICINE CLINIC | Facility: CLINIC | Age: 37
End: 2022-07-01

## 2022-07-01 VITALS
BODY MASS INDEX: 24.78 KG/M2 | HEART RATE: 82 BPM | SYSTOLIC BLOOD PRESSURE: 118 MMHG | DIASTOLIC BLOOD PRESSURE: 71 MMHG | WEIGHT: 177 LBS | HEIGHT: 71 IN | OXYGEN SATURATION: 96 %

## 2022-07-01 DIAGNOSIS — G40.909 NONINTRACTABLE EPILEPSY WITHOUT STATUS EPILEPTICUS, UNSPECIFIED EPILEPSY TYPE: Chronic | ICD-10-CM

## 2022-07-01 DIAGNOSIS — F90.0 ADHD, PREDOMINANTLY INATTENTIVE TYPE: Primary | Chronic | ICD-10-CM

## 2022-07-01 PROCEDURE — 99213 OFFICE O/P EST LOW 20 MIN: CPT | Performed by: FAMILY MEDICINE

## 2022-07-01 RX ORDER — HYDROCODONE BITARTRATE AND ACETAMINOPHEN 5; 325 MG/1; MG/1
TABLET ORAL
COMMUNITY
Start: 2022-06-07 | End: 2022-07-01

## 2022-07-01 NOTE — PROGRESS NOTES
"Subjective   Hammad Carpenter is a 36 y.o. male.  Valuation ADHD seizure disorder.  Continues on medicines without problem feels well no complaints no seizure activity.  Is around a lot of heat and humidity is staying and well-hydrated.  No real change.    History of Present Illness   HPI    The following portions of the patient's history were reviewed and updated as appropriate: allergies, current medications, past family history, past medical history, past social history, past surgical history and problem list.    Review of Systems  Review of Systems   Constitutional: Negative for activity change, appetite change, fatigue and unexpected weight change.   HENT: Negative for trouble swallowing and voice change.    Eyes: Negative for redness and visual disturbance.   Respiratory: Negative for cough and wheezing.    Cardiovascular: Negative for chest pain and palpitations.   Gastrointestinal: Negative for abdominal pain, constipation, diarrhea, nausea and vomiting.   Genitourinary: Negative for urgency.   Musculoskeletal: Negative for joint swelling.   Neurological: Negative for syncope and headaches.   Hematological: Negative for adenopathy.   Psychiatric/Behavioral: Negative for sleep disturbance.       Objective   Physical Exam  Physical Exam  Constitutional:       Appearance: Normal appearance. He is normal weight.   Neurological:      Mental Status: He is alert.   Psychiatric:         Mood and Affect: Mood normal.         Behavior: Behavior normal.         Thought Content: Thought content normal.         Judgment: Judgment normal.      Comments: Normal affect           Visit Vitals  /90 (BP Location: Right arm, Patient Position: Sitting, Cuff Size: Adult)   Pulse 82   Ht 180.3 cm (71\")   Wt 80.3 kg (177 lb)   SpO2 96%   BMI 24.69 kg/m²     Body mass index is 24.69 kg/m².  /71 (BP Location: Right arm, Patient Position: Sitting, Cuff Size: Adult)   Pulse 82   Ht 180.3 cm (71\")   Wt 80.3 kg (177 " lb)   SpO2 96%   BMI 24.69 kg/m²       Assessment/Plan   Diagnoses and all orders for this visit:    1. ADHD, predominantly inattentive type (Primary)    2. Nonintractable epilepsy without status epilepticus, unspecified epilepsy type (HCC)    Continue current medicines counseled on the COVID vaccines flu vaccines counseled on proper hydration in the summer recheck 3 months on schedule drug

## 2022-07-15 DIAGNOSIS — F90.0 ADHD, PREDOMINANTLY INATTENTIVE TYPE: Chronic | ICD-10-CM

## 2022-07-15 RX ORDER — DEXTROAMPHETAMINE SACCHARATE, AMPHETAMINE ASPARTATE, DEXTROAMPHETAMINE SULFATE AND AMPHETAMINE SULFATE 2.5; 2.5; 2.5; 2.5 MG/1; MG/1; MG/1; MG/1
10 TABLET ORAL 3 TIMES DAILY
Qty: 84 TABLET | Refills: 0 | Status: SHIPPED | OUTPATIENT
Start: 2022-07-15 | End: 2022-08-12 | Stop reason: SDUPTHER

## 2022-07-15 RX ORDER — DEXTROAMPHETAMINE SACCHARATE, AMPHETAMINE ASPARTATE, DEXTROAMPHETAMINE SULFATE AND AMPHETAMINE SULFATE 2.5; 2.5; 2.5; 2.5 MG/1; MG/1; MG/1; MG/1
10 TABLET ORAL 3 TIMES DAILY
Qty: 84 TABLET | Refills: 0 | Status: CANCELLED | OUTPATIENT
Start: 2022-07-15

## 2022-07-15 NOTE — TELEPHONE ENCOUNTER
Incoming Refill Request      Medication requested (name and dose): amphetamine-dextroamphetamine (ADDERALL) 10 MG tablet    [facility] where request should be sent[facility]    Additional details provided by patient: NONE    Best call back number: [phone]    Does the patient have less than a 3 day supply:  [] Yes  [x] No    Bere Yang Rep  07/15/22, 10:51 CDT          
102

## 2022-08-12 DIAGNOSIS — F90.0 ADHD, PREDOMINANTLY INATTENTIVE TYPE: Chronic | ICD-10-CM

## 2022-08-12 RX ORDER — DEXTROAMPHETAMINE SACCHARATE, AMPHETAMINE ASPARTATE, DEXTROAMPHETAMINE SULFATE AND AMPHETAMINE SULFATE 2.5; 2.5; 2.5; 2.5 MG/1; MG/1; MG/1; MG/1
10 TABLET ORAL 3 TIMES DAILY
Qty: 84 TABLET | Refills: 0 | Status: SHIPPED | OUTPATIENT
Start: 2022-08-12 | End: 2022-09-09 | Stop reason: SDUPTHER

## 2022-08-12 NOTE — TELEPHONE ENCOUNTER
Incoming Refill Request      Medication requested (name and dose): amphetamine-dextroamphetamine (ADDERALL) 10 MG tablet    [facility] where [facility] be sent[facility]    Additional details provided by patient: NONE    Best call back number: [phone]    Does the patient have less than a 3 day supply:  [x] Yes  [] No    Bere Yang Rep  08/12/22, 10:09 CDT

## 2022-08-13 DIAGNOSIS — G89.29 CHRONIC RIGHT SHOULDER PAIN: Chronic | ICD-10-CM

## 2022-08-13 DIAGNOSIS — M25.511 CHRONIC RIGHT SHOULDER PAIN: Chronic | ICD-10-CM

## 2022-08-15 RX ORDER — MELOXICAM 15 MG/1
15 TABLET ORAL DAILY
Qty: 30 TABLET | Refills: 2 | Status: SHIPPED | OUTPATIENT
Start: 2022-08-15 | End: 2022-09-30 | Stop reason: SDUPTHER

## 2022-09-09 DIAGNOSIS — F90.0 ADHD, PREDOMINANTLY INATTENTIVE TYPE: Chronic | ICD-10-CM

## 2022-09-09 RX ORDER — DEXTROAMPHETAMINE SACCHARATE, AMPHETAMINE ASPARTATE, DEXTROAMPHETAMINE SULFATE AND AMPHETAMINE SULFATE 2.5; 2.5; 2.5; 2.5 MG/1; MG/1; MG/1; MG/1
10 TABLET ORAL 3 TIMES DAILY
Qty: 84 TABLET | Refills: 0 | Status: SHIPPED | OUTPATIENT
Start: 2022-09-09 | End: 2022-10-10 | Stop reason: SDUPTHER

## 2022-09-09 RX ORDER — DEXTROAMPHETAMINE SACCHARATE, AMPHETAMINE ASPARTATE, DEXTROAMPHETAMINE SULFATE AND AMPHETAMINE SULFATE 2.5; 2.5; 2.5; 2.5 MG/1; MG/1; MG/1; MG/1
10 TABLET ORAL 3 TIMES DAILY
Qty: 84 TABLET | Refills: 0 | Status: CANCELLED | OUTPATIENT
Start: 2022-09-09

## 2022-09-09 NOTE — TELEPHONE ENCOUNTER
Incoming Refill Request      Medication requested (name and dose):     [facility]    Pharmacy where [facility] be sent:    [facility]    Additional details provided by patient:     Best call back number:     [phone]    Does the patient have less than a 3 day supply:  [x] Yes  [] No    Bere Jaffe Rep  09/09/22, 09:51 CDT

## 2022-09-19 DIAGNOSIS — G40.909 NONINTRACTABLE EPILEPSY WITHOUT STATUS EPILEPTICUS, UNSPECIFIED EPILEPSY TYPE: Chronic | ICD-10-CM

## 2022-09-19 RX ORDER — LAMOTRIGINE 200 MG/1
TABLET ORAL
Qty: 180 TABLET | Refills: 1 | Status: SHIPPED | OUTPATIENT
Start: 2022-09-19 | End: 2023-03-31

## 2022-09-20 DIAGNOSIS — G40.909 NONINTRACTABLE EPILEPSY WITHOUT STATUS EPILEPTICUS, UNSPECIFIED EPILEPSY TYPE: Chronic | ICD-10-CM

## 2022-09-20 RX ORDER — LAMOTRIGINE 200 MG/1
TABLET ORAL
Qty: 180 TABLET | Refills: 1 | OUTPATIENT
Start: 2022-09-20

## 2022-09-29 ENCOUNTER — LAB (OUTPATIENT)
Dept: LAB | Facility: HOSPITAL | Age: 37
End: 2022-09-29

## 2022-09-29 PROCEDURE — 80053 COMPREHEN METABOLIC PANEL: CPT | Performed by: FAMILY MEDICINE

## 2022-09-29 PROCEDURE — 86803 HEPATITIS C AB TEST: CPT | Performed by: FAMILY MEDICINE

## 2022-09-29 PROCEDURE — 36415 COLL VENOUS BLD VENIPUNCTURE: CPT | Performed by: FAMILY MEDICINE

## 2022-09-29 PROCEDURE — 85027 COMPLETE CBC AUTOMATED: CPT | Performed by: FAMILY MEDICINE

## 2022-09-30 ENCOUNTER — OFFICE VISIT (OUTPATIENT)
Dept: FAMILY MEDICINE CLINIC | Facility: CLINIC | Age: 37
End: 2022-09-30

## 2022-09-30 VITALS
DIASTOLIC BLOOD PRESSURE: 70 MMHG | BODY MASS INDEX: 25.34 KG/M2 | WEIGHT: 181 LBS | SYSTOLIC BLOOD PRESSURE: 118 MMHG | HEIGHT: 71 IN

## 2022-09-30 DIAGNOSIS — G40.909 NONINTRACTABLE EPILEPSY WITHOUT STATUS EPILEPTICUS, UNSPECIFIED EPILEPSY TYPE: Chronic | ICD-10-CM

## 2022-09-30 DIAGNOSIS — Z79.1 ENCOUNTER FOR MONITORING CHRONIC NSAID THERAPY: ICD-10-CM

## 2022-09-30 DIAGNOSIS — Z51.81 ENCOUNTER FOR MONITORING CHRONIC NSAID THERAPY: ICD-10-CM

## 2022-09-30 DIAGNOSIS — G89.29 CHRONIC RIGHT SHOULDER PAIN: Chronic | ICD-10-CM

## 2022-09-30 DIAGNOSIS — F90.0 ADHD, PREDOMINANTLY INATTENTIVE TYPE: Primary | Chronic | ICD-10-CM

## 2022-09-30 DIAGNOSIS — M25.511 CHRONIC RIGHT SHOULDER PAIN: Chronic | ICD-10-CM

## 2022-09-30 LAB
ALBUMIN SERPL-MCNC: 4.8 G/DL (ref 3.5–5.2)
ALBUMIN/GLOB SERPL: 2.5 G/DL
ALP SERPL-CCNC: 122 U/L (ref 39–117)
ALT SERPL W P-5'-P-CCNC: 36 U/L (ref 1–41)
ANION GAP SERPL CALCULATED.3IONS-SCNC: 9 MMOL/L (ref 5–15)
AST SERPL-CCNC: 25 U/L (ref 1–40)
BILIRUB SERPL-MCNC: 0.2 MG/DL (ref 0–1.2)
BUN SERPL-MCNC: 22 MG/DL (ref 6–20)
BUN/CREAT SERPL: 20.8 (ref 7–25)
CALCIUM SPEC-SCNC: 9.4 MG/DL (ref 8.6–10.5)
CHLORIDE SERPL-SCNC: 102 MMOL/L (ref 98–107)
CO2 SERPL-SCNC: 28 MMOL/L (ref 22–29)
CREAT SERPL-MCNC: 1.06 MG/DL (ref 0.76–1.27)
DEPRECATED RDW RBC AUTO: 38.3 FL (ref 37–54)
EGFRCR SERPLBLD CKD-EPI 2021: 92.1 ML/MIN/1.73
ERYTHROCYTE [DISTWIDTH] IN BLOOD BY AUTOMATED COUNT: 11.8 % (ref 12.3–15.4)
GLOBULIN UR ELPH-MCNC: 1.9 GM/DL
GLUCOSE SERPL-MCNC: 116 MG/DL (ref 65–99)
HCT VFR BLD AUTO: 37.8 % (ref 37.5–51)
HCV AB SER DONR QL: NORMAL
HGB BLD-MCNC: 13.4 G/DL (ref 13–17.7)
MCH RBC QN AUTO: 31.2 PG (ref 26.6–33)
MCHC RBC AUTO-ENTMCNC: 35.4 G/DL (ref 31.5–35.7)
MCV RBC AUTO: 88.1 FL (ref 79–97)
PLATELET # BLD AUTO: 301 10*3/MM3 (ref 140–450)
PMV BLD AUTO: 9.8 FL (ref 6–12)
POTASSIUM SERPL-SCNC: 4.7 MMOL/L (ref 3.5–5.2)
PROT SERPL-MCNC: 6.7 G/DL (ref 6–8.5)
RBC # BLD AUTO: 4.29 10*6/MM3 (ref 4.14–5.8)
SODIUM SERPL-SCNC: 139 MMOL/L (ref 136–145)
WBC NRBC COR # BLD: 5.74 10*3/MM3 (ref 3.4–10.8)

## 2022-09-30 PROCEDURE — 99214 OFFICE O/P EST MOD 30 MIN: CPT | Performed by: FAMILY MEDICINE

## 2022-09-30 RX ORDER — LEVETIRACETAM 750 MG/1
750 TABLET ORAL 2 TIMES DAILY
Qty: 180 TABLET | Refills: 1 | Status: SHIPPED | OUTPATIENT
Start: 2022-09-30

## 2022-09-30 RX ORDER — MELOXICAM 15 MG/1
15 TABLET ORAL DAILY
Qty: 90 TABLET | Refills: 2 | Status: SHIPPED | OUTPATIENT
Start: 2022-09-30

## 2022-09-30 NOTE — PROGRESS NOTES
Answers for HPI/ROS submitted by the patient on 9/30/2022  What is the primary reason for your visit?: Other  Please describe your symptoms.: Check up  Have you had these symptoms before?: Yes  How long have you been having these symptoms?: Greater than 2 weeks    Subjective   Hammad Carpenter is a 37 y.o. male.  Patient ADHD seizure disorder chronic shoulder pain.  Lab work acceptable.  No evidence of renal dysfunction.  We have counseled once again on using Mobic as needed only not chronically.  Other medicines seem to be doing well no seizure activity.  Is around a lot of heavy metals and we have counseled on prevention of absorption of same.  Will be getting a flu vaccine at work if wishes.  History noted.  History of Present Illness   HPI    The following portions of the patient's history were reviewed and updated as appropriate: allergies, current medications, past family history, past medical history, past social history, past surgical history and problem list.    Review of Systems  Review of Systems   Constitutional: Negative for activity change, appetite change, fatigue and unexpected weight change.   HENT: Negative for trouble swallowing and voice change.    Eyes: Negative for redness and visual disturbance.   Respiratory: Negative for cough and wheezing.    Cardiovascular: Negative for chest pain and palpitations.   Gastrointestinal: Negative for abdominal pain, constipation, diarrhea, nausea and vomiting.   Genitourinary: Negative for urgency.   Musculoskeletal: Positive for arthralgias. Negative for joint swelling.   Neurological: Negative for syncope and headaches.   Hematological: Negative for adenopathy.   Psychiatric/Behavioral: Negative for sleep disturbance.       Objective   Physical Exam  Physical Exam  Constitutional:       Appearance: Normal appearance. He is normal weight.   Musculoskeletal:      Comments: Up and go 3 to 5 seconds gait normal   Neurological:      Mental Status: He is  "alert.   Psychiatric:         Attention and Perception: Attention normal.         Mood and Affect: Mood normal.         Behavior: Behavior is cooperative.         Cognition and Memory: Cognition normal.       Results for orders placed or performed in visit on 03/25/22   Hepatitis C Antibody    Specimen: Blood   Result Value Ref Range    Hepatitis C Ab Non-Reactive Non-Reactive   CBC (No Diff)    Specimen: Blood   Result Value Ref Range    WBC 5.74 3.40 - 10.80 10*3/mm3    RBC 4.29 4.14 - 5.80 10*6/mm3    Hemoglobin 13.4 13.0 - 17.7 g/dL    Hematocrit 37.8 37.5 - 51.0 %    MCV 88.1 79.0 - 97.0 fL    MCH 31.2 26.6 - 33.0 pg    MCHC 35.4 31.5 - 35.7 g/dL    RDW 11.8 (L) 12.3 - 15.4 %    RDW-SD 38.3 37.0 - 54.0 fl    MPV 9.8 6.0 - 12.0 fL    Platelets 301 140 - 450 10*3/mm3   Comprehensive Metabolic Panel    Specimen: Blood   Result Value Ref Range    Glucose 116 (H) 65 - 99 mg/dL    BUN 22 (H) 6 - 20 mg/dL    Creatinine 1.06 0.76 - 1.27 mg/dL    Sodium 139 136 - 145 mmol/L    Potassium 4.7 3.5 - 5.2 mmol/L    Chloride 102 98 - 107 mmol/L    CO2 28.0 22.0 - 29.0 mmol/L    Calcium 9.4 8.6 - 10.5 mg/dL    Total Protein 6.7 6.0 - 8.5 g/dL    Albumin 4.80 3.50 - 5.20 g/dL    ALT (SGPT) 36 1 - 41 U/L    AST (SGOT) 25 1 - 40 U/L    Alkaline Phosphatase 122 (H) 39 - 117 U/L    Total Bilirubin 0.2 0.0 - 1.2 mg/dL    Globulin 1.9 gm/dL    A/G Ratio 2.5 g/dL    BUN/Creatinine Ratio 20.8 7.0 - 25.0    Anion Gap 9.0 5.0 - 15.0 mmol/L    eGFR 92.1 >60.0 mL/min/1.73           Visit Vitals  /70   Ht 180.3 cm (71\")   Wt 82.1 kg (181 lb)   BMI 25.24 kg/m²     Body mass index is 25.24 kg/m².    /70   Ht 180.3 cm (71\")   Wt 82.1 kg (181 lb)   BMI 25.24 kg/m²     Assessment/Plan   Diagnoses and all orders for this visit:    1. ADHD, predominantly inattentive type (Primary)    2. Nonintractable epilepsy without status epilepticus, unspecified epilepsy type (HCC)  -     levETIRAcetam (KEPPRA) 750 MG tablet; Take 1 tablet by " mouth 2 (Two) Times a Day.  Dispense: 180 tablet; Refill: 1    3. Chronic right shoulder pain  -     meloxicam (MOBIC) 15 MG tablet; Take 1 tablet by mouth Daily. As needed arthritic pain only  Dispense: 90 tablet; Refill: 2    4. Encounter for monitoring chronic NSAID therapy    Counseled again on nonsteroidal use counseled on hydration infection prevention.  Counseled on safety measures.  Recheck 3 months on schedule drug

## 2022-10-10 DIAGNOSIS — F90.0 ADHD, PREDOMINANTLY INATTENTIVE TYPE: Chronic | ICD-10-CM

## 2022-10-10 RX ORDER — DEXTROAMPHETAMINE SACCHARATE, AMPHETAMINE ASPARTATE, DEXTROAMPHETAMINE SULFATE AND AMPHETAMINE SULFATE 2.5; 2.5; 2.5; 2.5 MG/1; MG/1; MG/1; MG/1
10 TABLET ORAL 3 TIMES DAILY
Qty: 84 TABLET | Refills: 0 | Status: CANCELLED | OUTPATIENT
Start: 2022-10-10

## 2022-10-10 RX ORDER — DEXTROAMPHETAMINE SACCHARATE, AMPHETAMINE ASPARTATE, DEXTROAMPHETAMINE SULFATE AND AMPHETAMINE SULFATE 2.5; 2.5; 2.5; 2.5 MG/1; MG/1; MG/1; MG/1
10 TABLET ORAL 3 TIMES DAILY
Qty: 84 TABLET | Refills: 0 | Status: SHIPPED | OUTPATIENT
Start: 2022-10-10 | End: 2022-11-04 | Stop reason: SDUPTHER

## 2022-10-10 NOTE — TELEPHONE ENCOUNTER
Incoming Refill Request      Medication requested (name and dose):     [facility]    Pharmacy where request should be sent:     [facility]    Additional details provided by patient:    Pt only has 1 pill left    Best call back number:      [phone]    Does the patient have less than a 3 day supply:  [x] Yes  [] No    Bere Jaffe Rep  10/10/22, 09:23 CDT

## 2022-11-04 DIAGNOSIS — F90.0 ADHD, PREDOMINANTLY INATTENTIVE TYPE: Chronic | ICD-10-CM

## 2022-11-04 RX ORDER — DEXTROAMPHETAMINE SACCHARATE, AMPHETAMINE ASPARTATE, DEXTROAMPHETAMINE SULFATE AND AMPHETAMINE SULFATE 2.5; 2.5; 2.5; 2.5 MG/1; MG/1; MG/1; MG/1
10 TABLET ORAL 3 TIMES DAILY
Qty: 84 TABLET | Refills: 0 | Status: CANCELLED | OUTPATIENT
Start: 2022-11-04

## 2022-11-04 RX ORDER — DEXTROAMPHETAMINE SACCHARATE, AMPHETAMINE ASPARTATE, DEXTROAMPHETAMINE SULFATE AND AMPHETAMINE SULFATE 2.5; 2.5; 2.5; 2.5 MG/1; MG/1; MG/1; MG/1
10 TABLET ORAL 3 TIMES DAILY
Qty: 84 TABLET | Refills: 0 | Status: SHIPPED | OUTPATIENT
Start: 2022-11-04 | End: 2022-12-01 | Stop reason: SDUPTHER

## 2022-11-04 NOTE — TELEPHONE ENCOUNTER
Incoming Refill Request      Medication requested (name and dose): [facility]    Pharmacy where request should be sent: [facility]    Additional details provided by patient:     Best call back number: [phone]    Does the patient have less than a 3 day supply:  [] Yes  [x] No    Bere Rondon Rep  11/04/22, 12:52 CDT

## 2022-12-01 DIAGNOSIS — F90.0 ADHD, PREDOMINANTLY INATTENTIVE TYPE: Chronic | ICD-10-CM

## 2022-12-01 RX ORDER — DEXTROAMPHETAMINE SACCHARATE, AMPHETAMINE ASPARTATE, DEXTROAMPHETAMINE SULFATE AND AMPHETAMINE SULFATE 2.5; 2.5; 2.5; 2.5 MG/1; MG/1; MG/1; MG/1
10 TABLET ORAL 3 TIMES DAILY
Qty: 84 TABLET | Refills: 0 | Status: CANCELLED | OUTPATIENT
Start: 2022-12-01

## 2022-12-01 RX ORDER — DEXTROAMPHETAMINE SACCHARATE, AMPHETAMINE ASPARTATE, DEXTROAMPHETAMINE SULFATE AND AMPHETAMINE SULFATE 2.5; 2.5; 2.5; 2.5 MG/1; MG/1; MG/1; MG/1
10 TABLET ORAL 3 TIMES DAILY
Qty: 84 TABLET | Refills: 0 | Status: SHIPPED | OUTPATIENT
Start: 2022-12-01 | End: 2022-12-29 | Stop reason: SDUPTHER

## 2022-12-01 NOTE — TELEPHONE ENCOUNTER
Incoming Refill Request      Medication requested (name and dose): amphetamine-dextroamphetamine (ADDERALL) 10 MG tablet    Pharm[facility] where request [facility] be sent: [facility]     Additional details provided by patient:     Best call back number: [phone]    Does the patient have less than a 3 day supply:  [] Yes  [x] No    Bere Fabian Rep  12/01/22, 08:48 CST

## 2022-12-29 DIAGNOSIS — F90.0 ADHD, PREDOMINANTLY INATTENTIVE TYPE: Chronic | ICD-10-CM

## 2022-12-29 NOTE — TELEPHONE ENCOUNTER
Incoming Refill Request      Medication requested (name and dose): Adderall 10 mg    Pharmacy where request should be sent: [facility]    Additional details provided by patient: pt only has 2 days worth of medication    Best call back number: [phone]    Does the patient have less than a 3 day supply:  [x] Yes  [] No    Bere Rondon Rep  12/29/22, 12:27 CST

## 2022-12-29 NOTE — TELEPHONE ENCOUNTER
Dr. Pritchett Patient    Last Rx 12/01/2022  #84, NR    Next Appt  With Family Medicine (Mack Pritchett MD)  01/06/2023 at 9:30 AM    Last OV 09/2022    APRN's are on call the next 2 days.

## 2022-12-30 RX ORDER — DEXTROAMPHETAMINE SACCHARATE, AMPHETAMINE ASPARTATE, DEXTROAMPHETAMINE SULFATE AND AMPHETAMINE SULFATE 2.5; 2.5; 2.5; 2.5 MG/1; MG/1; MG/1; MG/1
10 TABLET ORAL 3 TIMES DAILY
Qty: 84 TABLET | Refills: 0 | Status: SHIPPED | OUTPATIENT
Start: 2022-12-30 | End: 2023-01-27 | Stop reason: SDUPTHER

## 2023-01-06 ENCOUNTER — OFFICE VISIT (OUTPATIENT)
Dept: FAMILY MEDICINE CLINIC | Facility: CLINIC | Age: 38
End: 2023-01-06
Payer: COMMERCIAL

## 2023-01-06 VITALS
SYSTOLIC BLOOD PRESSURE: 122 MMHG | BODY MASS INDEX: 25.55 KG/M2 | DIASTOLIC BLOOD PRESSURE: 80 MMHG | HEIGHT: 71 IN | WEIGHT: 182.5 LBS

## 2023-01-06 DIAGNOSIS — G40.909 NONINTRACTABLE EPILEPSY WITHOUT STATUS EPILEPTICUS, UNSPECIFIED EPILEPSY TYPE: Chronic | ICD-10-CM

## 2023-01-06 DIAGNOSIS — F90.0 ADHD, PREDOMINANTLY INATTENTIVE TYPE: Chronic | ICD-10-CM

## 2023-01-06 DIAGNOSIS — R21 RASH: Primary | ICD-10-CM

## 2023-01-06 DIAGNOSIS — M25.511 CHRONIC RIGHT SHOULDER PAIN: Chronic | ICD-10-CM

## 2023-01-06 DIAGNOSIS — F51.04 PSYCHOPHYSIOLOGICAL INSOMNIA: ICD-10-CM

## 2023-01-06 DIAGNOSIS — G89.29 CHRONIC RIGHT SHOULDER PAIN: Chronic | ICD-10-CM

## 2023-01-06 DIAGNOSIS — L21.9 SEBORRHEIC DERMATITIS: ICD-10-CM

## 2023-01-06 PROCEDURE — 99214 OFFICE O/P EST MOD 30 MIN: CPT | Performed by: FAMILY MEDICINE

## 2023-01-06 RX ORDER — KETOCONAZOLE 20 MG/ML
SHAMPOO TOPICAL
Qty: 240 ML | Refills: 3 | Status: SHIPPED | OUTPATIENT
Start: 2023-01-06

## 2023-01-06 RX ORDER — TRAZODONE HYDROCHLORIDE 50 MG/1
TABLET ORAL
Qty: 60 TABLET | Refills: 5 | Status: SHIPPED | OUTPATIENT
Start: 2023-01-06

## 2023-01-06 NOTE — PROGRESS NOTES
Subjective   Hammad Carpenter is a 37 y.o. male.  Reevaluation ADHD epilepsy chronic shoulder pain.  In the interim medicines are holding steady.  We does cut back on Mobic use for his chronic shoulder pain he has occasional pain but overall stable.  Has developed a facial rash wishes evaluation for same also complaining of mild insomnia multifactorial has taken trazodone in the past but at high doses causes sedation.  Requesting low-dose trazodone at night we have counseled pros and cons limitations of same.    History of Present Illness   HPI    The following portions of the patient's history were reviewed and updated as appropriate: allergies, current medications, past family history, past medical history, past social history, past surgical history and problem list.    Review of Systems  Review of Systems   Constitutional: Negative for activity change, appetite change, fatigue and unexpected weight change.   HENT: Negative for trouble swallowing and voice change.    Eyes: Negative for redness and visual disturbance.   Respiratory: Negative for cough and wheezing.    Cardiovascular: Negative for chest pain and palpitations.   Gastrointestinal: Negative for abdominal pain, constipation, diarrhea, nausea and vomiting.   Genitourinary: Negative for urgency.   Musculoskeletal: Positive for arthralgias. Negative for joint swelling.   Skin: Positive for rash.   Neurological: Negative for syncope and headaches.   Hematological: Negative for adenopathy.   Psychiatric/Behavioral: Positive for sleep disturbance.       Objective   Physical Exam  Physical Exam  Constitutional:       Appearance: He is well-developed.   HENT:      Head: Normocephalic.   Eyes:      Pupils: Pupils are equal, round, and reactive to light.   Neck:      Thyroid: No thyromegaly.   Cardiovascular:      Rate and Rhythm: Normal rate.      Heart sounds: No murmur heard.    No friction rub. No gallop.   Abdominal:      General: There is no  distension.      Palpations: Abdomen is soft. There is no mass.      Tenderness: There is no abdominal tenderness.   Musculoskeletal:         General: Normal range of motion.      Cervical back: Normal range of motion.      Comments: Get up and go 3 to 5 seconds shoulder range of motion is preserved   Skin:     General: Skin is warm and dry.      Comments: Mild, normal irritation around the perioral area with beard line.  Her mild seborrheic dermatitis related to same beard line.   Neurological:      Mental Status: He is alert and oriented to person, place, and time.      Deep Tendon Reflexes: Reflexes are normal and symmetric.   Psychiatric:         Attention and Perception: Attention normal.         Mood and Affect: Mood normal.         Speech: Speech normal.         Behavior: Behavior normal.         Thought Content: Thought content normal.         Cognition and Memory: Cognition normal.      Comments: Normal affect           Visit Vitals  /80   Ht 180.3 cm (71\")   Wt 82.8 kg (182 lb 8 oz)   BMI 25.45 kg/m²     Body mass index is 25.45 kg/m².    /80   Ht 180.3 cm (71\")   Wt 82.8 kg (182 lb 8 oz)   BMI 25.45 kg/m²     Assessment/Plan   Diagnoses and all orders for this visit:    1. ADHD, predominantly inattentive type (Primary)    2. Nonintractable epilepsy without status epilepticus, unspecified epilepsy type (HCC)    3. Rash    4. Seborrheic dermatitis  -     ketoconazole (Nizoral) 2 % shampoo; Twice a week leaving on beard line and scalp 3 to 5 minutes rinsing thoroughly x6 to 8 weeks  Dispense: 240 mL; Refill: 3    5. Chronic right shoulder pain    6. Psychophysiological insomnia  -     traZODone (DESYREL) 50 MG tablet; 1/2 to 1 pill nightly for sleep  Dispense: 60 tablet; Refill: 5    Counseled using Selsun containing shampoo 5 days a week 6 to 8 weeks ketoconazole 2 times a week for 6 to 8 weeks for the scalp widened beard line.  Counseled on the pros and cons of trazodone low-dose only.   Counseled mainly on lifestyle measures coping measures recheck 3 months due to protocol

## 2023-01-27 DIAGNOSIS — F90.0 ADHD, PREDOMINANTLY INATTENTIVE TYPE: Chronic | ICD-10-CM

## 2023-01-27 RX ORDER — DEXTROAMPHETAMINE SACCHARATE, AMPHETAMINE ASPARTATE, DEXTROAMPHETAMINE SULFATE AND AMPHETAMINE SULFATE 2.5; 2.5; 2.5; 2.5 MG/1; MG/1; MG/1; MG/1
10 TABLET ORAL 3 TIMES DAILY
Qty: 84 TABLET | Refills: 0 | Status: SHIPPED | OUTPATIENT
Start: 2023-01-27 | End: 2023-02-22 | Stop reason: SDUPTHER

## 2023-01-27 RX ORDER — DEXTROAMPHETAMINE SACCHARATE, AMPHETAMINE ASPARTATE, DEXTROAMPHETAMINE SULFATE AND AMPHETAMINE SULFATE 2.5; 2.5; 2.5; 2.5 MG/1; MG/1; MG/1; MG/1
10 TABLET ORAL 3 TIMES DAILY
Qty: 84 TABLET | Refills: 0 | Status: CANCELLED | OUTPATIENT
Start: 2023-01-27

## 2023-01-27 NOTE — TELEPHONE ENCOUNTER
Incoming Refill Request      Medication requested (name and dose): [facility]    Pharmacy where request [facility] be sent: [facility]     Additional details provided by patient:     Best call back number: [phone]    Does the patient have less than a 3 day supply:  [] Yes  [x] No    Bere Rondon Rep  01/27/23, 12:26 CST

## 2023-02-17 ENCOUNTER — TELEPHONE (OUTPATIENT)
Dept: FAMILY MEDICINE CLINIC | Facility: CLINIC | Age: 38
End: 2023-02-17
Payer: COMMERCIAL

## 2023-02-17 NOTE — TELEPHONE ENCOUNTER
Please call and speak with pt concerning a upcoming business trip and his Adderall script. His script is due on the 27 and he has to leave that morning before [facility]. He is wanting to if it can be called in a few days early or what he needs to do. Thanks!

## 2023-02-22 ENCOUNTER — TELEPHONE (OUTPATIENT)
Dept: FAMILY MEDICINE CLINIC | Facility: CLINIC | Age: 38
End: 2023-02-22
Payer: COMMERCIAL

## 2023-02-22 DIAGNOSIS — F90.0 ADHD, PREDOMINANTLY INATTENTIVE TYPE: Chronic | ICD-10-CM

## 2023-02-22 RX ORDER — DEXTROAMPHETAMINE SACCHARATE, AMPHETAMINE ASPARTATE, DEXTROAMPHETAMINE SULFATE AND AMPHETAMINE SULFATE 2.5; 2.5; 2.5; 2.5 MG/1; MG/1; MG/1; MG/1
10 TABLET ORAL 3 TIMES DAILY
Qty: 84 TABLET | Refills: 0 | Status: SHIPPED | OUTPATIENT
Start: 2023-02-22 | End: 2023-03-24 | Stop reason: SDUPTHER

## 2023-02-22 NOTE — TELEPHONE ENCOUNTER
Wants a call back from Katia when she talks to  about getting filled on 26th - adderal     Call at [phone]

## 2023-03-24 DIAGNOSIS — F90.0 ADHD, PREDOMINANTLY INATTENTIVE TYPE: Chronic | ICD-10-CM

## 2023-03-24 RX ORDER — DEXTROAMPHETAMINE SACCHARATE, AMPHETAMINE ASPARTATE, DEXTROAMPHETAMINE SULFATE AND AMPHETAMINE SULFATE 2.5; 2.5; 2.5; 2.5 MG/1; MG/1; MG/1; MG/1
10 TABLET ORAL 3 TIMES DAILY
Qty: 84 TABLET | Refills: 0 | Status: SHIPPED | OUTPATIENT
Start: 2023-03-24 | End: 2023-03-27 | Stop reason: SDUPTHER

## 2023-03-24 RX ORDER — DEXTROAMPHETAMINE SACCHARATE, AMPHETAMINE ASPARTATE, DEXTROAMPHETAMINE SULFATE AND AMPHETAMINE SULFATE 2.5; 2.5; 2.5; 2.5 MG/1; MG/1; MG/1; MG/1
10 TABLET ORAL 3 TIMES DAILY
Qty: 84 TABLET | Refills: 0 | Status: CANCELLED | OUTPATIENT
Start: 2023-03-24

## 2023-03-27 ENCOUNTER — TELEPHONE (OUTPATIENT)
Dept: FAMILY MEDICINE CLINIC | Facility: CLINIC | Age: 38
End: 2023-03-27
Payer: COMMERCIAL

## 2023-03-27 DIAGNOSIS — F90.0 ADHD, PREDOMINANTLY INATTENTIVE TYPE: Chronic | ICD-10-CM

## 2023-03-27 RX ORDER — DEXTROAMPHETAMINE SACCHARATE, AMPHETAMINE ASPARTATE, DEXTROAMPHETAMINE SULFATE AND AMPHETAMINE SULFATE 5; 5; 5; 5 MG/1; MG/1; MG/1; MG/1
TABLET ORAL
Qty: 42 TABLET | Refills: 0 | Status: SHIPPED | OUTPATIENT
Start: 2023-03-27

## 2023-03-27 RX ORDER — DEXTROAMPHETAMINE SACCHARATE, AMPHETAMINE ASPARTATE, DEXTROAMPHETAMINE SULFATE AND AMPHETAMINE SULFATE 2.5; 2.5; 2.5; 2.5 MG/1; MG/1; MG/1; MG/1
TABLET ORAL
Qty: 42 TABLET | Refills: 0 | Status: SHIPPED | OUTPATIENT
Start: 2023-03-27 | End: 2023-03-27

## 2023-03-27 NOTE — TELEPHONE ENCOUNTER
[facility] but they have a limited supply of the 20 mg.  Jayden is wanting to know if Dr Pritchett will call in him the 20 mg and he can split them in h  mejia. He only has enough for today. Please call back asap.      Call back # [phone]

## 2023-03-31 DIAGNOSIS — G40.909 NONINTRACTABLE EPILEPSY WITHOUT STATUS EPILEPTICUS, UNSPECIFIED EPILEPSY TYPE: Chronic | ICD-10-CM

## 2023-03-31 RX ORDER — LAMOTRIGINE 200 MG/1
TABLET ORAL
Qty: 180 TABLET | Refills: 1 | Status: SHIPPED | OUTPATIENT
Start: 2023-03-31

## 2023-04-13 ENCOUNTER — OFFICE VISIT (OUTPATIENT)
Dept: FAMILY MEDICINE CLINIC | Facility: CLINIC | Age: 38
End: 2023-04-13
Payer: COMMERCIAL

## 2023-04-13 VITALS
DIASTOLIC BLOOD PRESSURE: 80 MMHG | SYSTOLIC BLOOD PRESSURE: 124 MMHG | BODY MASS INDEX: 25.48 KG/M2 | HEIGHT: 71 IN | WEIGHT: 182 LBS

## 2023-04-13 DIAGNOSIS — G47.9 SLEEP DISTURBANCE: ICD-10-CM

## 2023-04-13 DIAGNOSIS — G40.909 NONINTRACTABLE EPILEPSY WITHOUT STATUS EPILEPTICUS, UNSPECIFIED EPILEPSY TYPE: Chronic | ICD-10-CM

## 2023-04-13 DIAGNOSIS — F90.0 ADHD, PREDOMINANTLY INATTENTIVE TYPE: Primary | Chronic | ICD-10-CM

## 2023-04-13 PROBLEM — G47.10 HYPERSOMNIA: Chronic | Status: RESOLVED | Noted: 2021-10-08 | Resolved: 2023-04-13

## 2023-04-13 PROCEDURE — 99213 OFFICE O/P EST LOW 20 MIN: CPT | Performed by: FAMILY MEDICINE

## 2023-04-13 NOTE — PROGRESS NOTES
"Allison Carpenter is a 37 y.o. male.  Evaluation for medication ADHD scheduled drug a seizure disorder sleep and tolerance of sleep problems.  In the interim trazodone seems to help partially.  Other medicines have remained the same.  Maintaining weight and exercise.  Trying to be more active.  Overall stable.    History of Present Illness   HPI    The following portions of the patient's history were reviewed and updated as appropriate: allergies, current medications, past family history, past medical history, past social history, past surgical history and problem list.    Review of Systems  Review of Systems   Constitutional: Negative for activity change, appetite change, fatigue and unexpected weight change.   HENT: Negative for trouble swallowing and voice change.    Eyes: Negative for redness and visual disturbance.   Respiratory: Negative for cough and wheezing.    Cardiovascular: Negative for chest pain and palpitations.   Gastrointestinal: Positive for abdominal distention (Dietary counseled on same). Negative for abdominal pain, constipation, diarrhea, nausea and vomiting.   Genitourinary: Negative for urgency.   Musculoskeletal: Negative for joint swelling.   Neurological: Negative for syncope and headaches.   Hematological: Negative for adenopathy.   Psychiatric/Behavioral: Negative for sleep disturbance.       Objective   Physical Exam  Physical Exam  Constitutional:       Appearance: Normal appearance. He is normal weight.   Neurological:      Mental Status: He is alert.   Psychiatric:         Mood and Affect: Mood normal.         Behavior: Behavior normal.         Thought Content: Thought content normal.         Judgment: Judgment normal.           Visit Vitals  /80   Ht 180.3 cm (71\")   Wt 82.6 kg (182 lb)   BMI 25.38 kg/m²     Body mass index is 25.38 kg/m².    /80   Ht 180.3 cm (71\")   Wt 82.6 kg (182 lb)   BMI 25.38 kg/m² '    Assessment/Plan   Diagnoses and all " orders for this visit:    1. ADHD, predominantly inattentive type (Primary)    2. Nonintractable epilepsy without status epilepticus, unspecified epilepsy type    3. Sleep disturbance    Counseled on sleep hygiene lifestyle measures discussed recheck 3 months per protocol

## 2023-04-20 DIAGNOSIS — F90.0 ADHD, PREDOMINANTLY INATTENTIVE TYPE: Chronic | ICD-10-CM

## 2023-04-20 RX ORDER — DEXTROAMPHETAMINE SACCHARATE, AMPHETAMINE ASPARTATE, DEXTROAMPHETAMINE SULFATE AND AMPHETAMINE SULFATE 5; 5; 5; 5 MG/1; MG/1; MG/1; MG/1
TABLET ORAL
Qty: 42 TABLET | Refills: 0 | Status: CANCELLED | OUTPATIENT
Start: 2023-04-20

## 2023-04-20 RX ORDER — DEXTROAMPHETAMINE SACCHARATE, AMPHETAMINE ASPARTATE, DEXTROAMPHETAMINE SULFATE AND AMPHETAMINE SULFATE 5; 5; 5; 5 MG/1; MG/1; MG/1; MG/1
TABLET ORAL
Qty: 42 TABLET | Refills: 0 | Status: SHIPPED | OUTPATIENT
Start: 2023-04-20

## 2023-04-20 NOTE — TELEPHONE ENCOUNTER
Incoming Refill Request      Medication requested (name and dose): amphetamine-dextroamphetamine (Adderall) 20 MG tablet    [facility] where [facility] be sent[facility]    Additional details provided by patient:     Best call back number: [phone]    Does the patient have less than a 3 day supply:  [x] Yes  [] No    Bere Blancas Rep  04/20/23, 12:44 CDT

## 2023-05-10 ENCOUNTER — TELEPHONE (OUTPATIENT)
Dept: FAMILY MEDICINE CLINIC | Facility: CLINIC | Age: 38
End: 2023-05-10
Payer: COMMERCIAL

## 2023-05-10 DIAGNOSIS — Z30.09 FAMILY PLANNING: Primary | ICD-10-CM

## 2023-05-10 NOTE — TELEPHONE ENCOUNTER
Needing referral Urologist, needing a vasotomy.  Needing to speak with Dr. Pritchett concerning this. [place] Dr. Jenkins was good with this.    Pt call back is [phone]

## 2023-05-11 DIAGNOSIS — Z30.09 FAMILY PLANNING: Primary | ICD-10-CM

## 2023-05-19 DIAGNOSIS — F90.0 ADHD, PREDOMINANTLY INATTENTIVE TYPE: Chronic | ICD-10-CM

## 2023-05-19 RX ORDER — DEXTROAMPHETAMINE SACCHARATE, AMPHETAMINE ASPARTATE, DEXTROAMPHETAMINE SULFATE AND AMPHETAMINE SULFATE 5; 5; 5; 5 MG/1; MG/1; MG/1; MG/1
TABLET ORAL
Qty: 42 TABLET | Refills: 0 | Status: CANCELLED | OUTPATIENT
Start: 2023-05-19

## 2023-05-19 RX ORDER — DEXTROAMPHETAMINE SACCHARATE, AMPHETAMINE ASPARTATE, DEXTROAMPHETAMINE SULFATE AND AMPHETAMINE SULFATE 5; 5; 5; 5 MG/1; MG/1; MG/1; MG/1
TABLET ORAL
Qty: 42 TABLET | Refills: 0 | Status: SHIPPED | OUTPATIENT
Start: 2023-05-19

## 2023-05-19 NOTE — TELEPHONE ENCOUNTER
Incoming Refill Request      Medication requested (name and dose):amphetamine-dextroamphetamine (Adderall) 20 MG tablet     [facility] where request should be sent[facility]    Additional details provided by patient:     Best call back number: [phone]    Does the patient have less than a 3 day supply:  [x] Yes  [] No    Bere Blancas Rep  05/19/23, 10:33 CDT

## 2023-06-15 DIAGNOSIS — F90.0 ADHD, PREDOMINANTLY INATTENTIVE TYPE: Chronic | ICD-10-CM

## 2023-06-15 RX ORDER — DEXTROAMPHETAMINE SACCHARATE, AMPHETAMINE ASPARTATE, DEXTROAMPHETAMINE SULFATE AND AMPHETAMINE SULFATE 5; 5; 5; 5 MG/1; MG/1; MG/1; MG/1
TABLET ORAL
Qty: 42 TABLET | Refills: 0 | Status: SHIPPED | OUTPATIENT
Start: 2023-06-15

## 2023-06-15 RX ORDER — DEXTROAMPHETAMINE SACCHARATE, AMPHETAMINE ASPARTATE, DEXTROAMPHETAMINE SULFATE AND AMPHETAMINE SULFATE 5; 5; 5; 5 MG/1; MG/1; MG/1; MG/1
TABLET ORAL
Qty: 42 TABLET | Refills: 0 | Status: CANCELLED | OUTPATIENT
Start: 2023-06-15

## 2023-06-15 NOTE — TELEPHONE ENCOUNTER
Incoming Refill Request      Medication requested (name and dose): amphetamine-dextroamphetamine (Adderall) 20 MG tablet     Pharmacy where request should be sent: [facility]    Additional details provided by patient: Would like to pick on Monday    Best call back number: [phone]    Does the patient have less than a 3 day supply:  [x] Yes  [x] No    Bere Blancas Rep  06/15/23, 14:49 CDT

## 2023-08-14 DIAGNOSIS — F90.0 ADHD, PREDOMINANTLY INATTENTIVE TYPE: Chronic | ICD-10-CM

## 2023-08-14 RX ORDER — DEXTROAMPHETAMINE SACCHARATE, AMPHETAMINE ASPARTATE, DEXTROAMPHETAMINE SULFATE AND AMPHETAMINE SULFATE 5; 5; 5; 5 MG/1; MG/1; MG/1; MG/1
TABLET ORAL
Qty: 42 TABLET | Refills: 0 | Status: SHIPPED | OUTPATIENT
Start: 2023-08-14

## 2023-08-14 NOTE — TELEPHONE ENCOUNTER
Incoming Refill Request      Medication requested (name and dose): [facility]    Pharmacy where request should be sent[facility]    Additional details provided by patient:     Dr. Pritchett is on vacation - pt has taken last dose.   Next OV 10/13    Best call back number: [phone]    Does the patient have less than a 3 day supply:  [x] Yes  [] No    Bere Jaffe Rep  08/14/23, 09:35 CDT

## 2023-09-11 DIAGNOSIS — F90.0 ADHD, PREDOMINANTLY INATTENTIVE TYPE: Chronic | ICD-10-CM

## 2023-09-11 RX ORDER — DEXTROAMPHETAMINE SACCHARATE, AMPHETAMINE ASPARTATE, DEXTROAMPHETAMINE SULFATE AND AMPHETAMINE SULFATE 5; 5; 5; 5 MG/1; MG/1; MG/1; MG/1
TABLET ORAL
Qty: 42 TABLET | Refills: 0 | Status: SHIPPED | OUTPATIENT
Start: 2023-09-11

## 2023-09-11 RX ORDER — DEXTROAMPHETAMINE SACCHARATE, AMPHETAMINE ASPARTATE, DEXTROAMPHETAMINE SULFATE AND AMPHETAMINE SULFATE 5; 5; 5; 5 MG/1; MG/1; MG/1; MG/1
TABLET ORAL
Qty: 42 TABLET | Refills: 0 | Status: CANCELLED | OUTPATIENT
Start: 2023-09-11

## 2023-09-11 NOTE — TELEPHONE ENCOUNTER
Incoming Refill Request      Medication requested (name and dose): [facility]    Pharmacy where request should be sent: [facility]    Additional details provided by patient:     Best call back number: [phone]    Does the patient have less than a 3 day supply:  [x] Yes  [] No    Bere Jaffe Rep  09/11/23, 12:54 CDT

## 2023-09-29 DIAGNOSIS — G40.909 NONINTRACTABLE EPILEPSY WITHOUT STATUS EPILEPTICUS, UNSPECIFIED EPILEPSY TYPE: Chronic | ICD-10-CM

## 2023-09-29 RX ORDER — LAMOTRIGINE 200 MG/1
TABLET ORAL
Qty: 180 TABLET | Refills: 1 | Status: SHIPPED | OUTPATIENT
Start: 2023-09-29

## 2025-03-01 NOTE — TELEPHONE ENCOUNTER
PT went to  adderall at pharmacy and the quantity in it was only 30 tablets but he takes 3 a day and it suppose a month supply. He picked it up yesterday and has already taken 4. He would like a call back [phone]    Thank you   3